# Patient Record
Sex: MALE | Race: OTHER | Employment: FULL TIME | URBAN - METROPOLITAN AREA
[De-identification: names, ages, dates, MRNs, and addresses within clinical notes are randomized per-mention and may not be internally consistent; named-entity substitution may affect disease eponyms.]

---

## 2022-03-18 PROBLEM — Z71.2 ENCOUNTER TO DISCUSS TEST RESULTS: Status: ACTIVE | Noted: 2019-08-09

## 2022-03-18 PROBLEM — R74.01 ELEVATED ALT MEASUREMENT: Status: ACTIVE | Noted: 2019-07-25

## 2022-03-18 PROBLEM — S67.10XA CRUSHING INJURY OF FINGER: Status: ACTIVE | Noted: 2021-12-14

## 2022-03-18 PROBLEM — R21 RASH: Status: ACTIVE | Noted: 2019-07-25

## 2022-03-19 PROBLEM — Z13.228 SCREENING FOR METABOLIC DISORDER: Status: ACTIVE | Noted: 2019-07-25

## 2022-03-19 PROBLEM — B36.0 PITYRIASIS VERSICOLOR: Status: ACTIVE | Noted: 2019-07-25

## 2022-03-19 PROBLEM — S69.92XA INJURY OF NAIL BED OF FINGER OF LEFT HAND: Status: ACTIVE | Noted: 2021-12-14

## 2022-03-19 PROBLEM — Z71.3 DIETARY COUNSELING: Status: ACTIVE | Noted: 2019-07-25

## 2022-03-20 PROBLEM — Z71.82 EXERCISE COUNSELING: Status: ACTIVE | Noted: 2019-07-25

## 2022-03-20 PROBLEM — S62.665A CLOSED NONDISPLACED FRACTURE OF DISTAL PHALANX OF LEFT RING FINGER: Status: ACTIVE | Noted: 2021-12-14

## 2022-03-20 PROBLEM — E78.5 HYPERLIPIDEMIA: Status: ACTIVE | Noted: 2019-08-09

## 2023-02-02 ENCOUNTER — ANESTHESIA (OUTPATIENT)
Dept: SURGERY | Age: 23
End: 2023-02-02
Payer: COMMERCIAL

## 2023-02-02 ENCOUNTER — HOSPITAL ENCOUNTER (OUTPATIENT)
Age: 23
Setting detail: OBSERVATION
LOS: 1 days | Discharge: HOME | End: 2023-02-03
Attending: EMERGENCY MEDICINE | Admitting: SURGERY
Payer: COMMERCIAL

## 2023-02-02 ENCOUNTER — ANESTHESIA EVENT (OUTPATIENT)
Dept: SURGERY | Age: 23
End: 2023-02-02
Payer: COMMERCIAL

## 2023-02-02 ENCOUNTER — APPOINTMENT (OUTPATIENT)
Dept: CT IMAGING | Age: 23
End: 2023-02-02
Payer: COMMERCIAL

## 2023-02-02 DIAGNOSIS — K35.80 ACUTE APPENDICITIS, UNSPECIFIED ACUTE APPENDICITIS TYPE: Primary | ICD-10-CM

## 2023-02-02 LAB
ALBUMIN SERPL-MCNC: 4.3 G/DL (ref 3.5–5)
ALBUMIN/GLOB SERPL: 1.4 (ref 0.4–1.6)
ALP SERPL-CCNC: 94 U/L (ref 50–136)
ALT SERPL-CCNC: 180 U/L (ref 12–65)
ANION GAP SERPL CALC-SCNC: 7 MMOL/L (ref 2–11)
APPEARANCE UR: ABNORMAL
AST SERPL-CCNC: 52 U/L (ref 15–37)
BASOPHILS # BLD: 0.1 K/UL (ref 0–0.2)
BASOPHILS NFR BLD: 0 % (ref 0–2)
BILIRUB SERPL-MCNC: 0.4 MG/DL (ref 0.2–1.1)
BILIRUB UR QL: NEGATIVE
BUN SERPL-MCNC: 15 MG/DL (ref 6–23)
CALCIUM SERPL-MCNC: 9.4 MG/DL (ref 8.3–10.4)
CHLORIDE SERPL-SCNC: 109 MMOL/L (ref 101–110)
CO2 SERPL-SCNC: 26 MMOL/L (ref 21–32)
COLOR UR: ABNORMAL
CREAT SERPL-MCNC: 1 MG/DL (ref 0.8–1.5)
DIFFERENTIAL METHOD BLD: ABNORMAL
EOSINOPHIL # BLD: 0.1 K/UL (ref 0–0.8)
EOSINOPHIL NFR BLD: 1 % (ref 0.5–7.8)
ERYTHROCYTE [DISTWIDTH] IN BLOOD BY AUTOMATED COUNT: 13.7 % (ref 11.9–14.6)
GLOBULIN SER CALC-MCNC: 3.1 G/DL (ref 2.8–4.5)
GLUCOSE SERPL-MCNC: 113 MG/DL (ref 65–100)
GLUCOSE UR STRIP.AUTO-MCNC: NEGATIVE MG/DL
HCT VFR BLD AUTO: 45.9 % (ref 41.1–50.3)
HGB BLD-MCNC: 15.4 G/DL (ref 13.6–17.2)
HGB UR QL STRIP: NEGATIVE
IMM GRANULOCYTES # BLD AUTO: 0.1 K/UL (ref 0–0.5)
IMM GRANULOCYTES NFR BLD AUTO: 1 % (ref 0–5)
KETONES UR QL STRIP.AUTO: NEGATIVE MG/DL
LEUKOCYTE ESTERASE UR QL STRIP.AUTO: NEGATIVE
LIPASE SERPL-CCNC: 79 U/L (ref 73–393)
LYMPHOCYTES # BLD: 2 K/UL (ref 0.5–4.6)
LYMPHOCYTES NFR BLD: 14 % (ref 13–44)
MCH RBC QN AUTO: 27.9 PG (ref 26.1–32.9)
MCHC RBC AUTO-ENTMCNC: 33.6 G/DL (ref 31.4–35)
MCV RBC AUTO: 83.3 FL (ref 82–102)
MONOCYTES # BLD: 0.8 K/UL (ref 0.1–1.3)
MONOCYTES NFR BLD: 6 % (ref 4–12)
NEUTS SEG # BLD: 11.3 K/UL (ref 1.7–8.2)
NEUTS SEG NFR BLD: 78 % (ref 43–78)
NITRITE UR QL STRIP.AUTO: NEGATIVE
NRBC # BLD: 0 K/UL (ref 0–0.2)
PH UR STRIP: 6.5 (ref 5–9)
PLATELET # BLD AUTO: 316 K/UL (ref 150–450)
PMV BLD AUTO: 8.7 FL (ref 9.4–12.3)
POTASSIUM SERPL-SCNC: 4 MMOL/L (ref 3.5–5.1)
PROT SERPL-MCNC: 7.4 G/DL (ref 6.3–8.2)
PROT UR STRIP-MCNC: NEGATIVE MG/DL
RBC # BLD AUTO: 5.51 M/UL (ref 4.23–5.6)
SODIUM SERPL-SCNC: 142 MMOL/L (ref 133–143)
SP GR UR REFRACTOMETRY: 1.03 (ref 1–1.02)
UROBILINOGEN UR QL STRIP.AUTO: 1 EU/DL (ref 0.2–1)
WBC # BLD AUTO: 14.4 K/UL (ref 4.3–11.1)

## 2023-02-02 PROCEDURE — 2720000010 HC SURG SUPPLY STERILE: Performed by: SURGERY

## 2023-02-02 PROCEDURE — 6360000002 HC RX W HCPCS: Performed by: SURGERY

## 2023-02-02 PROCEDURE — 3600000014 HC SURGERY LEVEL 4 ADDTL 15MIN: Performed by: SURGERY

## 2023-02-02 PROCEDURE — 6360000002 HC RX W HCPCS: Performed by: REGISTERED NURSE

## 2023-02-02 PROCEDURE — 96374 THER/PROPH/DIAG INJ IV PUSH: CPT

## 2023-02-02 PROCEDURE — 7100000001 HC PACU RECOVERY - ADDTL 15 MIN: Performed by: SURGERY

## 2023-02-02 PROCEDURE — 2500000003 HC RX 250 WO HCPCS: Performed by: REGISTERED NURSE

## 2023-02-02 PROCEDURE — 96365 THER/PROPH/DIAG IV INF INIT: CPT

## 2023-02-02 PROCEDURE — 83690 ASSAY OF LIPASE: CPT

## 2023-02-02 PROCEDURE — 2580000003 HC RX 258: Performed by: SURGERY

## 2023-02-02 PROCEDURE — 6360000004 HC RX CONTRAST MEDICATION: Performed by: PHYSICIAN ASSISTANT

## 2023-02-02 PROCEDURE — 85025 COMPLETE CBC W/AUTO DIFF WBC: CPT

## 2023-02-02 PROCEDURE — 1100000000 HC RM PRIVATE

## 2023-02-02 PROCEDURE — 80053 COMPREHEN METABOLIC PANEL: CPT

## 2023-02-02 PROCEDURE — 3600000004 HC SURGERY LEVEL 4 BASE: Performed by: SURGERY

## 2023-02-02 PROCEDURE — 6370000000 HC RX 637 (ALT 250 FOR IP): Performed by: SURGERY

## 2023-02-02 PROCEDURE — 3700000000 HC ANESTHESIA ATTENDED CARE: Performed by: SURGERY

## 2023-02-02 PROCEDURE — 2709999900 HC NON-CHARGEABLE SUPPLY: Performed by: SURGERY

## 2023-02-02 PROCEDURE — 88304 TISSUE EXAM BY PATHOLOGIST: CPT

## 2023-02-02 PROCEDURE — 96375 TX/PRO/DX INJ NEW DRUG ADDON: CPT

## 2023-02-02 PROCEDURE — 6360000002 HC RX W HCPCS: Performed by: PHYSICIAN ASSISTANT

## 2023-02-02 PROCEDURE — 2580000003 HC RX 258: Performed by: REGISTERED NURSE

## 2023-02-02 PROCEDURE — 99285 EMERGENCY DEPT VISIT HI MDM: CPT

## 2023-02-02 PROCEDURE — 3700000001 HC ADD 15 MINUTES (ANESTHESIA): Performed by: SURGERY

## 2023-02-02 PROCEDURE — 2580000003 HC RX 258: Performed by: PHYSICIAN ASSISTANT

## 2023-02-02 PROCEDURE — 81003 URINALYSIS AUTO W/O SCOPE: CPT

## 2023-02-02 PROCEDURE — 2500000003 HC RX 250 WO HCPCS: Performed by: SURGERY

## 2023-02-02 PROCEDURE — 74177 CT ABD & PELVIS W/CONTRAST: CPT

## 2023-02-02 PROCEDURE — 7100000000 HC PACU RECOVERY - FIRST 15 MIN: Performed by: SURGERY

## 2023-02-02 RX ORDER — LIDOCAINE HYDROCHLORIDE 20 MG/ML
INJECTION, SOLUTION EPIDURAL; INFILTRATION; INTRACAUDAL; PERINEURAL PRN
Status: DISCONTINUED | OUTPATIENT
Start: 2023-02-02 | End: 2023-02-02 | Stop reason: SDUPTHER

## 2023-02-02 RX ORDER — BUPIVACAINE HYDROCHLORIDE 5 MG/ML
INJECTION, SOLUTION EPIDURAL; INTRACAUDAL PRN
Status: DISCONTINUED | OUTPATIENT
Start: 2023-02-02 | End: 2023-02-02 | Stop reason: HOSPADM

## 2023-02-02 RX ORDER — SUCCINYLCHOLINE/SOD CL,ISO/PF 200MG/10ML
SYRINGE (ML) INTRAVENOUS PRN
Status: DISCONTINUED | OUTPATIENT
Start: 2023-02-02 | End: 2023-02-02 | Stop reason: SDUPTHER

## 2023-02-02 RX ORDER — SODIUM CHLORIDE, SODIUM LACTATE, POTASSIUM CHLORIDE, CALCIUM CHLORIDE 600; 310; 30; 20 MG/100ML; MG/100ML; MG/100ML; MG/100ML
INJECTION, SOLUTION INTRAVENOUS CONTINUOUS PRN
Status: DISCONTINUED | OUTPATIENT
Start: 2023-02-02 | End: 2023-02-02 | Stop reason: SDUPTHER

## 2023-02-02 RX ORDER — ACETAMINOPHEN 325 MG/1
650 TABLET ORAL EVERY 4 HOURS PRN
Status: DISCONTINUED | OUTPATIENT
Start: 2023-02-02 | End: 2023-02-03 | Stop reason: HOSPADM

## 2023-02-02 RX ORDER — ROCURONIUM BROMIDE 10 MG/ML
INJECTION, SOLUTION INTRAVENOUS PRN
Status: DISCONTINUED | OUTPATIENT
Start: 2023-02-02 | End: 2023-02-02 | Stop reason: SDUPTHER

## 2023-02-02 RX ORDER — ONDANSETRON 2 MG/ML
4 INJECTION INTRAMUSCULAR; INTRAVENOUS EVERY 6 HOURS PRN
Status: DISCONTINUED | OUTPATIENT
Start: 2023-02-02 | End: 2023-02-03 | Stop reason: HOSPADM

## 2023-02-02 RX ORDER — NEOSTIGMINE METHYLSULFATE 1 MG/ML
INJECTION, SOLUTION INTRAVENOUS PRN
Status: DISCONTINUED | OUTPATIENT
Start: 2023-02-02 | End: 2023-02-02 | Stop reason: SDUPTHER

## 2023-02-02 RX ORDER — SODIUM CHLORIDE 0.9 % (FLUSH) 0.9 %
5-40 SYRINGE (ML) INJECTION PRN
Status: DISCONTINUED | OUTPATIENT
Start: 2023-02-02 | End: 2023-02-02 | Stop reason: HOSPADM

## 2023-02-02 RX ORDER — OXYCODONE HYDROCHLORIDE 5 MG/1
5 TABLET ORAL PRN
Status: DISCONTINUED | OUTPATIENT
Start: 2023-02-02 | End: 2023-02-02 | Stop reason: HOSPADM

## 2023-02-02 RX ORDER — MORPHINE SULFATE 2 MG/ML
2 INJECTION, SOLUTION INTRAMUSCULAR; INTRAVENOUS EVERY 4 HOURS PRN
Status: DISCONTINUED | OUTPATIENT
Start: 2023-02-02 | End: 2023-02-03 | Stop reason: HOSPADM

## 2023-02-02 RX ORDER — PROPOFOL 10 MG/ML
INJECTION, EMULSION INTRAVENOUS PRN
Status: DISCONTINUED | OUTPATIENT
Start: 2023-02-02 | End: 2023-02-02 | Stop reason: SDUPTHER

## 2023-02-02 RX ORDER — OXYCODONE HYDROCHLORIDE 5 MG/1
10 TABLET ORAL PRN
Status: DISCONTINUED | OUTPATIENT
Start: 2023-02-02 | End: 2023-02-02 | Stop reason: HOSPADM

## 2023-02-02 RX ORDER — FENTANYL CITRATE 50 UG/ML
INJECTION, SOLUTION INTRAMUSCULAR; INTRAVENOUS PRN
Status: DISCONTINUED | OUTPATIENT
Start: 2023-02-02 | End: 2023-02-02 | Stop reason: SDUPTHER

## 2023-02-02 RX ORDER — ZOLPIDEM TARTRATE 5 MG/1
5 TABLET ORAL NIGHTLY PRN
Status: DISCONTINUED | OUTPATIENT
Start: 2023-02-02 | End: 2023-02-03 | Stop reason: HOSPADM

## 2023-02-02 RX ORDER — HYDROMORPHONE HCL 110MG/55ML
0.5 PATIENT CONTROLLED ANALGESIA SYRINGE INTRAVENOUS EVERY 5 MIN PRN
Status: DISCONTINUED | OUTPATIENT
Start: 2023-02-02 | End: 2023-02-02 | Stop reason: HOSPADM

## 2023-02-02 RX ORDER — SODIUM CHLORIDE 0.9 % (FLUSH) 0.9 %
5-40 SYRINGE (ML) INJECTION EVERY 12 HOURS SCHEDULED
Status: DISCONTINUED | OUTPATIENT
Start: 2023-02-02 | End: 2023-02-02 | Stop reason: HOSPADM

## 2023-02-02 RX ORDER — 0.9 % SODIUM CHLORIDE 0.9 %
100 INTRAVENOUS SOLUTION INTRAVENOUS
Status: COMPLETED | OUTPATIENT
Start: 2023-02-02 | End: 2023-02-02

## 2023-02-02 RX ORDER — ONDANSETRON 2 MG/ML
4 INJECTION INTRAMUSCULAR; INTRAVENOUS ONCE
Status: COMPLETED | OUTPATIENT
Start: 2023-02-02 | End: 2023-02-02

## 2023-02-02 RX ORDER — OXYCODONE HYDROCHLORIDE 5 MG/1
5 TABLET ORAL EVERY 4 HOURS PRN
Status: DISCONTINUED | OUTPATIENT
Start: 2023-02-02 | End: 2023-02-03 | Stop reason: HOSPADM

## 2023-02-02 RX ORDER — SODIUM CHLORIDE 0.9 % (FLUSH) 0.9 %
10 SYRINGE (ML) INJECTION
Status: COMPLETED | OUTPATIENT
Start: 2023-02-02 | End: 2023-02-02

## 2023-02-02 RX ORDER — SODIUM CHLORIDE 9 MG/ML
INJECTION, SOLUTION INTRAVENOUS PRN
Status: DISCONTINUED | OUTPATIENT
Start: 2023-02-02 | End: 2023-02-02 | Stop reason: HOSPADM

## 2023-02-02 RX ORDER — HYDROMORPHONE HCL 110MG/55ML
0.25 PATIENT CONTROLLED ANALGESIA SYRINGE INTRAVENOUS EVERY 5 MIN PRN
Status: DISCONTINUED | OUTPATIENT
Start: 2023-02-02 | End: 2023-02-02 | Stop reason: HOSPADM

## 2023-02-02 RX ORDER — OXYCODONE HYDROCHLORIDE 5 MG/1
10 TABLET ORAL EVERY 4 HOURS PRN
Status: DISCONTINUED | OUTPATIENT
Start: 2023-02-02 | End: 2023-02-03 | Stop reason: HOSPADM

## 2023-02-02 RX ORDER — ONDANSETRON 2 MG/ML
4 INJECTION INTRAMUSCULAR; INTRAVENOUS
Status: DISCONTINUED | OUTPATIENT
Start: 2023-02-02 | End: 2023-02-02 | Stop reason: HOSPADM

## 2023-02-02 RX ORDER — 0.9 % SODIUM CHLORIDE 0.9 %
1000 INTRAVENOUS SOLUTION INTRAVENOUS ONCE
Status: COMPLETED | OUTPATIENT
Start: 2023-02-02 | End: 2023-02-02

## 2023-02-02 RX ORDER — GLYCOPYRROLATE 0.2 MG/ML
INJECTION INTRAMUSCULAR; INTRAVENOUS PRN
Status: DISCONTINUED | OUTPATIENT
Start: 2023-02-02 | End: 2023-02-02 | Stop reason: SDUPTHER

## 2023-02-02 RX ORDER — DIPHENHYDRAMINE HYDROCHLORIDE 50 MG/ML
6.25 INJECTION INTRAMUSCULAR; INTRAVENOUS
Status: DISCONTINUED | OUTPATIENT
Start: 2023-02-02 | End: 2023-02-02 | Stop reason: HOSPADM

## 2023-02-02 RX ORDER — MORPHINE SULFATE 4 MG/ML
4 INJECTION, SOLUTION INTRAMUSCULAR; INTRAVENOUS
Status: DISCONTINUED | OUTPATIENT
Start: 2023-02-02 | End: 2023-02-02

## 2023-02-02 RX ORDER — SODIUM CHLORIDE 9 MG/ML
INJECTION, SOLUTION INTRAVENOUS CONTINUOUS
Status: DISCONTINUED | OUTPATIENT
Start: 2023-02-02 | End: 2023-02-03 | Stop reason: HOSPADM

## 2023-02-02 RX ADMIN — ONDANSETRON 4 MG: 2 INJECTION INTRAMUSCULAR; INTRAVENOUS at 16:59

## 2023-02-02 RX ADMIN — ROCURONIUM BROMIDE 5 MG: 50 INJECTION INTRAVENOUS at 20:45

## 2023-02-02 RX ADMIN — SODIUM CHLORIDE, SODIUM LACTATE, POTASSIUM CHLORIDE, AND CALCIUM CHLORIDE: 600; 310; 30; 20 INJECTION, SOLUTION INTRAVENOUS at 20:38

## 2023-02-02 RX ADMIN — ROCURONIUM BROMIDE 20 MG: 50 INJECTION INTRAVENOUS at 20:58

## 2023-02-02 RX ADMIN — ONDANSETRON 4 MG: 2 INJECTION INTRAMUSCULAR; INTRAVENOUS at 23:31

## 2023-02-02 RX ADMIN — SODIUM CHLORIDE: 9 INJECTION, SOLUTION INTRAVENOUS at 23:17

## 2023-02-02 RX ADMIN — IOPAMIDOL 100 ML: 755 INJECTION, SOLUTION INTRAVENOUS at 17:59

## 2023-02-02 RX ADMIN — Medication 3 MG: at 21:26

## 2023-02-02 RX ADMIN — GLYCOPYRROLATE 0.4 MG: 0.2 INJECTION INTRAMUSCULAR; INTRAVENOUS at 21:26

## 2023-02-02 RX ADMIN — OXYCODONE HYDROCHLORIDE 10 MG: 5 TABLET ORAL at 23:31

## 2023-02-02 RX ADMIN — SODIUM CHLORIDE 100 ML: 9 INJECTION, SOLUTION INTRAVENOUS at 17:59

## 2023-02-02 RX ADMIN — SODIUM CHLORIDE, PRESERVATIVE FREE 10 ML: 5 INJECTION INTRAVENOUS at 17:59

## 2023-02-02 RX ADMIN — FENTANYL CITRATE 100 MCG: 50 INJECTION, SOLUTION INTRAMUSCULAR; INTRAVENOUS at 20:42

## 2023-02-02 RX ADMIN — PIPERACILLIN AND TAZOBACTAM 4500 MG: 4; .5 INJECTION, POWDER, LYOPHILIZED, FOR SOLUTION INTRAVENOUS at 18:46

## 2023-02-02 RX ADMIN — Medication 140 MG: at 20:45

## 2023-02-02 RX ADMIN — SODIUM CHLORIDE 1000 ML: 9 INJECTION, SOLUTION INTRAVENOUS at 16:58

## 2023-02-02 RX ADMIN — PROPOFOL 200 MG: 10 INJECTION, EMULSION INTRAVENOUS at 20:45

## 2023-02-02 RX ADMIN — LIDOCAINE HYDROCHLORIDE 100 MG: 20 INJECTION, SOLUTION EPIDURAL; INFILTRATION; INTRACAUDAL; PERINEURAL at 20:45

## 2023-02-02 ASSESSMENT — PAIN DESCRIPTION - PAIN TYPE: TYPE: SURGICAL PAIN

## 2023-02-02 ASSESSMENT — LIFESTYLE VARIABLES
HOW MANY STANDARD DRINKS CONTAINING ALCOHOL DO YOU HAVE ON A TYPICAL DAY: 1 OR 2
HOW OFTEN DO YOU HAVE A DRINK CONTAINING ALCOHOL: 2-4 TIMES A MONTH

## 2023-02-02 ASSESSMENT — ENCOUNTER SYMPTOMS
RESPIRATORY NEGATIVE: 1
ABDOMINAL PAIN: 1
NAUSEA: 1

## 2023-02-02 ASSESSMENT — PAIN DESCRIPTION - LOCATION
LOCATION: ABDOMEN
LOCATION: ABDOMEN

## 2023-02-02 ASSESSMENT — PAIN SCALES - GENERAL
PAINLEVEL_OUTOF10: 3
PAINLEVEL_OUTOF10: 7
PAINLEVEL_OUTOF10: 3

## 2023-02-02 ASSESSMENT — PAIN - FUNCTIONAL ASSESSMENT
PAIN_FUNCTIONAL_ASSESSMENT: 0-10
PAIN_FUNCTIONAL_ASSESSMENT: NONE - DENIES PAIN
PAIN_FUNCTIONAL_ASSESSMENT: 0-10

## 2023-02-02 ASSESSMENT — PAIN DESCRIPTION - ORIENTATION
ORIENTATION: ANTERIOR;MID
ORIENTATION: RIGHT;LOWER

## 2023-02-02 ASSESSMENT — PAIN DESCRIPTION - DESCRIPTORS
DESCRIPTORS: SHARP;STABBING
DESCRIPTORS: ACHING;SHARP

## 2023-02-02 NOTE — ED TRIAGE NOTES
Pt to ED from home. Pt c/o of sharp pain in right lower abdomen starting this morning. Worsens with ambulation or standing up. Pt took ibuprofen at home and states it has helped some. Pt denies any issues with urination. + nausea. Denies chest pain, headaches, dizziness, or VD.

## 2023-02-02 NOTE — ED PROVIDER NOTES
Emergency Department Provider Note                   PCP:                Elizabeth Vegas MD               Age: 25 y.o. Sex: male       ICD-10-CM    1. Acute appendicitis, unspecified acute appendicitis type  K35.80           DISPOSITION Decision To Admit 02/02/2023 06:36:52 PM        Medical Decision Making  Patient is 61-year-old male with no significant past medical history who presents with 1 day of right lower quadrant abdominal pain associated with nausea. White count 14,000. CT shows uncomplicated acute appendicitis. Surgery team consulted for admission via [email or web address]. Jeanette De Luna has contacted Dr. Camille Turner. [facility] the patient n.p.o. The patient has a lot of questions about surgery. Will defer to surgery team to answer these questions. Amount and/or Complexity of Data Reviewed  Labs: ordered. Radiology: ordered. Risk  Prescription drug management. Decision regarding hospitalization.                                 Orders Placed This Encounter   Procedures    CT ABDOMEN PELVIS W IV CONTRAST Additional Contrast? None    CBC with Auto Differential    CMP    Lipase    Urinalysis w rflx microscopic        Medications   0.9 % sodium chloride bolus (1,000 mLs IntraVENous New Bag 2/2/23 1658)   morphine sulfate (PF) injection 4 mg (0 mg IntraVENous Held 2/2/23 1720)   piperacillin-tazobactam (ZOSYN) 4,500 mg in sodium chloride 0.9 % 100 mL IVPB (mini-bag) (has no administration in time range)   ondansetron (ZOFRAN) injection 4 mg (4 mg IntraVENous Given 2/2/23 1659)   iopamidol (ISOVUE-370) 76 % injection 100 mL (100 mLs IntraVENous Given 2/2/23 1759)   0.9 % sodium chloride bolus (100 mLs IntraVENous New Bag 2/2/23 1759)   sodium chloride flush 0.9 % injection 10 mL (10 mLs IntraVENous Given 2/2/23 1759)       New Prescriptions    No medications on file        Paul Goff is a 25 y.o. male who presents to the Emergency Department with chief complaint of    Chief Complaint   Patient presents with    Abdominal Pain      Patient is a 72-year-old male with no significant past medical history who presents with right lower abdominal pain that began this morning. Initially was coming and going but now it is constant associated with nausea. Worse with movement. No urinary complaints. No diarrhea or vomiting. No fever. No prior abdominal surgeries. No history of the same. He took ibuprofen with some improvement of the pain. Review of Systems   Constitutional: Negative. HENT: Negative. Respiratory: Negative. Cardiovascular: Negative. Gastrointestinal:  Positive for abdominal pain and nausea. Genitourinary: Negative. All other systems reviewed and are negative. No past medical history on file. No past surgical history on file. Family History   Problem Relation Age of Onset    No Known Problems Mother     No Known Problems Father         Social History     Socioeconomic History    Marital status: Single   Tobacco Use    Smoking status: Never    Smokeless tobacco: Never   Substance and Sexual Activity    Alcohol use: Never    Drug use: Never         Patient has no known allergies. Previous Medications    KETOCONAZOLE (NIZORAL) 2 % SHAMPOO    Apply shampoo foam to affected area and leave it on for 5-10 min and rinse ,twice a week for 2-4 weeks        Vitals signs and nursing note reviewed. Patient Vitals for the past 4 hrs:   Temp Pulse Resp BP SpO2   02/02/23 1612 -- -- 18 -- --   02/02/23 1607 98.2 °F (36.8 °C) 99 -- (!) 143/84 99 %          Physical Exam  Vitals and nursing note reviewed. Constitutional:       General: He is not in acute distress. Appearance: Normal appearance. He is normal weight. He is not ill-appearing or toxic-appearing. HENT:      Head: Normocephalic. Eyes:      Extraocular Movements: Extraocular movements intact.    Cardiovascular:      Rate and Rhythm: Normal rate and regular rhythm. Pulses: Normal pulses. Heart sounds: Normal heart sounds. Pulmonary:      Effort: Pulmonary effort is normal.      Breath sounds: Normal breath sounds. Abdominal:      Palpations: Abdomen is soft. Tenderness: There is abdominal tenderness in the right lower quadrant. There is no guarding or rebound. Musculoskeletal:         General: No swelling or tenderness. Normal range of motion. Cervical back: Normal range of motion and neck supple. Skin:     General: Skin is warm and dry. Findings: No rash. Neurological:      General: No focal deficit present. Mental Status: He is alert and oriented to person, place, and time. Mental status is at baseline. Psychiatric:         Mood and Affect: Mood normal.         Behavior: Behavior normal.         Thought Content: Thought content normal.        Procedures    Results for orders placed or performed during the hospital encounter of 02/02/23   CT ABDOMEN PELVIS W IV CONTRAST Additional Contrast? None    Narrative    STUDY: CT ABDOMEN PELVIS W IV CONTRAST [id number]     STUDY DATE: 2/2/2023 6:04 PM     HISTORY: RLQ abd pain     COMPARISON:  None available. TECHNIQUE: Contiguous axial CT images were obtained of the abdomen and pelvis  with the administration of intravenous contrast. Coronal and sagittal  reconstructions were performed. Radiation dose reduction techniques were used  for this study: All CT scans performed at this facility use one or all of the  following: Automated exposure control, adjustment of the mA and/or kVp according  to patient's size, iterative reconstruction. CONTRAST 100 mL of Isovue-370    FINDINGS:    LOWER THORAX: Lung bases are clear. No pericardial or pleural effusion. LIVER: Marked hepatic steatosis. BILIARY: The gallbladder is normal. No intrahepatic or extrahepatic biliary duct  dilation. PANCREAS: Unremarkable. SPLEEN: No splenomegaly.     ADRENALS: No adrenal nodule or adrenal hypertrophy. URINARY: No nephrolithiasis. No hydronephrosis. The bladder is unremarkable. BOWEL: Stomach, small bowel, and large bowel are normal in course and caliber. No bowel wall thickening. No pneumoperitoneum. The appendix is enlarged with  mild adjacent fat stranding. VASCULAR: The abdominal aorta and iliac arterial system are normal in caliber. NODES: No lymphadenopathy. FLUID:  No intraperitoneal free fluid. REPRODUCTIVE: Unremarkable. BONES/SOFT TISSUE: No fracture or aggressive osseous abnormality. No acute  subcutaneous soft tissue abnormality. Bilateral gynecomastia. Impression    1. Findings compatible with acute, uncomplicated appendicitis. 2.  Hepatic cirrhosis.        CBC with Auto Differential   Result Value Ref Range    WBC 14.4 (H) 4.3 - 11.1 K/uL    RBC 5.51 4.23 - 5.6 M/uL    Hemoglobin 15.4 13.6 - 17.2 g/dL    Hematocrit 45.9 41.1 - 50.3 %    MCV 83.3 82 - 102 FL    MCH 27.9 26.1 - 32.9 PG    MCHC 33.6 31.4 - 35.0 g/dL    RDW 13.7 11.9 - 14.6 %    Platelets 252 415 - 831 K/uL    MPV 8.7 (L) 9.4 - 12.3 FL    nRBC 0.00 0.0 - 0.2 K/uL    Differential Type AUTOMATED      Seg Neutrophils 78 43 - 78 %    Lymphocytes 14 13 - 44 %    Monocytes 6 4.0 - 12.0 %    Eosinophils % 1 0.5 - 7.8 %    Basophils 0 0.0 - 2.0 %    Immature Granulocytes 1 0.0 - 5.0 %    Segs Absolute 11.3 (H) 1.7 - 8.2 K/UL    Absolute Lymph # 2.0 0.5 - 4.6 K/UL    Absolute Mono # 0.8 0.1 - 1.3 K/UL    Absolute Eos # 0.1 0.0 - 0.8 K/UL    Basophils Absolute 0.1 0.0 - 0.2 K/UL    Absolute Immature Granulocyte 0.1 0.0 - 0.5 K/UL   CMP   Result Value Ref Range    Sodium 142 133 - 143 mmol/L    Potassium 4.0 3.5 - 5.1 mmol/L    Chloride 109 101 - 110 mmol/L    CO2 26 21 - 32 mmol/L    Anion Gap 7 2 - 11 mmol/L    Glucose 113 (H) 65 - 100 mg/dL    BUN 15 6 - 23 MG/DL    Creatinine 1.00 0.8 - 1.5 MG/DL    Est, Glom Filt Rate >60 >60 ml/min/1.73m2    Calcium 9.4 8.3 - 10.4 MG/DL    Total Bilirubin 0.4 0.2 - 1.1 MG/DL     (H) 12 - 65 U/L    AST 52 (H) 15 - 37 U/L    Alk Phosphatase 94 50 - 136 U/L    Total Protein 7.4 6.3 - 8.2 g/dL    Albumin 4.3 3.5 - 5.0 g/dL    Globulin 3.1 2.8 - 4.5 g/dL    Albumin/Globulin Ratio 1.4 0.4 - 1.6     Lipase   Result Value Ref Range    Lipase 79 73 - 393 U/L   Urinalysis w rflx microscopic   Result Value Ref Range    Color, UA YELLOW/STRAW      Appearance CLOUDY      Specific Gravity, UA 1.029 (H) 1.001 - 1.023      pH, Urine 6.5 5.0 - 9.0      Protein, UA Negative NEG mg/dL    Glucose, UA Negative mg/dL    Ketones, Urine Negative NEG mg/dL    Bilirubin Urine Negative NEG      Blood, Urine Negative NEG      Urobilinogen, Urine 1.0 0.2 - 1.0 EU/dL    Nitrite, Urine Negative NEG      Leukocyte Esterase, Urine Negative NEG          CT ABDOMEN PELVIS W IV CONTRAST Additional Contrast? None   Final Result      1. Findings compatible with acute, uncomplicated appendicitis. 2.  Hepatic cirrhosis. Voice dictation software was used during the making of this note. This software is not perfect and grammatical and other typographical errors may be present. This note has not been completely proofread for errors.        Jalil Veema  02/02/23 [id number]

## 2023-02-03 VITALS
SYSTOLIC BLOOD PRESSURE: 123 MMHG | DIASTOLIC BLOOD PRESSURE: 69 MMHG | RESPIRATION RATE: 18 BRPM | WEIGHT: 198.2 LBS | TEMPERATURE: 98.2 F | HEIGHT: 68 IN | OXYGEN SATURATION: 95 % | BODY MASS INDEX: 30.04 KG/M2 | HEART RATE: 102 BPM

## 2023-02-03 LAB
ANION GAP SERPL CALC-SCNC: 8 MMOL/L (ref 2–11)
BASOPHILS # BLD: 0 K/UL (ref 0–0.2)
BASOPHILS NFR BLD: 0 % (ref 0–2)
BUN SERPL-MCNC: 11 MG/DL (ref 6–23)
CALCIUM SERPL-MCNC: 8.5 MG/DL (ref 8.3–10.4)
CHLORIDE SERPL-SCNC: 108 MMOL/L (ref 101–110)
CO2 SERPL-SCNC: 23 MMOL/L (ref 21–32)
CREAT SERPL-MCNC: 1.1 MG/DL (ref 0.8–1.5)
DIFFERENTIAL METHOD BLD: ABNORMAL
EOSINOPHIL # BLD: 0 K/UL (ref 0–0.8)
EOSINOPHIL NFR BLD: 0 % (ref 0.5–7.8)
ERYTHROCYTE [DISTWIDTH] IN BLOOD BY AUTOMATED COUNT: 13.8 % (ref 11.9–14.6)
GLUCOSE SERPL-MCNC: 98 MG/DL (ref 65–100)
HCT VFR BLD AUTO: 42.6 % (ref 41.1–50.3)
HGB BLD-MCNC: 14.4 G/DL (ref 13.6–17.2)
IMM GRANULOCYTES # BLD AUTO: 0.1 K/UL (ref 0–0.5)
IMM GRANULOCYTES NFR BLD AUTO: 1 % (ref 0–5)
LYMPHOCYTES # BLD: 2.9 K/UL (ref 0.5–4.6)
LYMPHOCYTES NFR BLD: 15 % (ref 13–44)
MCH RBC QN AUTO: 28.3 PG (ref 26.1–32.9)
MCHC RBC AUTO-ENTMCNC: 33.8 G/DL (ref 31.4–35)
MCV RBC AUTO: 83.9 FL (ref 82–102)
MONOCYTES # BLD: 1.1 K/UL (ref 0.1–1.3)
MONOCYTES NFR BLD: 6 % (ref 4–12)
NEUTS SEG # BLD: 15 K/UL (ref 1.7–8.2)
NEUTS SEG NFR BLD: 78 % (ref 43–78)
NRBC # BLD: 0 K/UL (ref 0–0.2)
PLATELET # BLD AUTO: 285 K/UL (ref 150–450)
PMV BLD AUTO: 8.9 FL (ref 9.4–12.3)
POTASSIUM SERPL-SCNC: 3.6 MMOL/L (ref 3.5–5.1)
RBC # BLD AUTO: 5.08 M/UL (ref 4.23–5.6)
SODIUM SERPL-SCNC: 139 MMOL/L (ref 133–143)
WBC # BLD AUTO: 19.1 K/UL (ref 4.3–11.1)

## 2023-02-03 PROCEDURE — 6360000002 HC RX W HCPCS: Performed by: SURGERY

## 2023-02-03 PROCEDURE — 36415 COLL VENOUS BLD VENIPUNCTURE: CPT

## 2023-02-03 PROCEDURE — 6370000000 HC RX 637 (ALT 250 FOR IP): Performed by: SURGERY

## 2023-02-03 PROCEDURE — 80048 BASIC METABOLIC PNL TOTAL CA: CPT

## 2023-02-03 PROCEDURE — 85025 COMPLETE CBC W/AUTO DIFF WBC: CPT

## 2023-02-03 PROCEDURE — G0378 HOSPITAL OBSERVATION PER HR: HCPCS

## 2023-02-03 PROCEDURE — 2580000003 HC RX 258: Performed by: SURGERY

## 2023-02-03 RX ORDER — OXYCODONE HYDROCHLORIDE AND ACETAMINOPHEN 5; 325 MG/1; MG/1
1 TABLET ORAL EVERY 6 HOURS PRN
Qty: 12 TABLET | Refills: 0 | Status: SHIPPED | OUTPATIENT
Start: 2023-02-03 | End: 2023-02-08

## 2023-02-03 RX ORDER — AMOXICILLIN AND CLAVULANATE POTASSIUM 875; 125 MG/1; MG/1
1 TABLET, FILM COATED ORAL 2 TIMES DAILY
Qty: 14 TABLET | Refills: 0 | Status: SHIPPED | OUTPATIENT
Start: 2023-02-03 | End: 2023-02-10

## 2023-02-03 RX ADMIN — MORPHINE SULFATE 2 MG: 2 INJECTION, SOLUTION INTRAMUSCULAR; INTRAVENOUS at 00:51

## 2023-02-03 RX ADMIN — ACETAMINOPHEN 650 MG: 325 TABLET ORAL at 08:47

## 2023-02-03 RX ADMIN — PIPERACILLIN AND TAZOBACTAM 3375 MG: 3; .375 INJECTION, POWDER, LYOPHILIZED, FOR SOLUTION INTRAVENOUS at 08:42

## 2023-02-03 RX ADMIN — PIPERACILLIN AND TAZOBACTAM 3375 MG: 3; .375 INJECTION, POWDER, LYOPHILIZED, FOR SOLUTION INTRAVENOUS at 01:18

## 2023-02-03 ASSESSMENT — PAIN DESCRIPTION - LOCATION
LOCATION: ABDOMEN

## 2023-02-03 ASSESSMENT — ENCOUNTER SYMPTOMS
VOMITING: 0
ABDOMINAL DISTENTION: 0
DIARRHEA: 0
NAUSEA: 0
CONSTIPATION: 0
GASTROINTESTINAL NEGATIVE: 1

## 2023-02-03 ASSESSMENT — PAIN DESCRIPTION - ONSET: ONSET: GRADUAL

## 2023-02-03 ASSESSMENT — PAIN SCALES - GENERAL
PAINLEVEL_OUTOF10: 7
PAINLEVEL_OUTOF10: 1
PAINLEVEL_OUTOF10: 7
PAINLEVEL_OUTOF10: 3
PAINLEVEL_OUTOF10: 7
PAINLEVEL_OUTOF10: 0

## 2023-02-03 ASSESSMENT — PAIN DESCRIPTION - DESCRIPTORS
DESCRIPTORS: ACHING;SHARP
DESCRIPTORS: SHARP
DESCRIPTORS: ACHING
DESCRIPTORS: SHARP
DESCRIPTORS: ACHING;SHARP

## 2023-02-03 ASSESSMENT — PAIN DESCRIPTION - ORIENTATION
ORIENTATION: MID;ANTERIOR
ORIENTATION: ANTERIOR
ORIENTATION: ANTERIOR
ORIENTATION: MID;LOWER
ORIENTATION: ANTERIOR;MID

## 2023-02-03 ASSESSMENT — PAIN DESCRIPTION - FREQUENCY: FREQUENCY: INTERMITTENT

## 2023-02-03 ASSESSMENT — PAIN DESCRIPTION - PAIN TYPE
TYPE: SURGICAL PAIN

## 2023-02-03 ASSESSMENT — PAIN - FUNCTIONAL ASSESSMENT: PAIN_FUNCTIONAL_ASSESSMENT: ACTIVITIES ARE NOT PREVENTED

## 2023-02-03 NOTE — PROGRESS NOTES
[address] [facility]  [phone]        Name: Javier Varghese      MRN: [id number]       : 2000             PCP: Zach Michaud MD       CC:  No chief complaint on file. HPI:  Eligio Varghese is a 25y.o. year-old male who presents for a post-op visit s/p LAPAROSCOPIC APPENDECTOMY done per Dr. Merlyn Samuels on 2023. Findings: appendicitis    Patient reports     Patient denies    Path Report:   Date Obtained:   2023   DIAGNOSIS        \"APPENDIX\":  CARCINOID TUMOR, 0.5 CM IN GREATEST DIMENSION, MARGINS UNINVOLVED;   ACUTE APPENDICITIS. The appendix shows a 0.5 cm focus of carcinoid tumor in the mid   portion. Transmural acute inflammation is also seen. The margin is free   of tumor. Dissection reveals grossly unremarkable appendiceal mucosa and no fecaliths. Objective:   Review of Systems   Constitutional:  Negative for chills and fever. Gastrointestinal: Negative. Negative for abdominal distention, constipation, diarrhea, nausea and vomiting. Physical Exam  Constitutional:       Appearance: Normal appearance. Abdominal:      General: Bowel sounds are normal.      Palpations: Abdomen is soft. Comments: Trochar sites approximated with no erythema or drainage. Skin:     General: Skin is warm. Capillary Refill: Capillary refill takes less than 2 seconds. Neurological:      General: No focal deficit present. Mental Status: He is alert and oriented to person, place, and time. Psychiatric:         Mood and Affect: Mood normal.         Behavior: Behavior normal.       Assessment/Plan:   25y.o. year-old male who presents for a post-op visit s/p LAPAROSCOPIC APPENDECTOMY done per Dr. Merlyn Samuels on 2023.   Copy of path report given and discussed with patient.     -Follow up PRN  -No heavy lifting > 20 pounds x 4 weeks post op  -No tub bath until trochar sites are completely healed  -Diet as tolerated    Bishop Cox, APRN - NP

## 2023-02-03 NOTE — PROGRESS NOTES
GENERAL SURGERY HISTORY AND PHYSICAL    Name:  Mikaela Fajardo  Date/Time of Admission: 2023  4:22 PM  CSN: [id number]  Attending Provider: Lenin Beal MD  Room/Bed:  [place] [address]  : 2000  25 y.o. SUBJECTIVE:    CHIEF COMPLAINT: abdominal pain    HPI: 70-year-old male with no significant past medical history who presents with 1 day of right lower quadrant abdominal pain associated with nausea. White count 14,000. CT shows uncomplicated acute appendicitis. No past medical history on file. No past surgical history on file. Patient has no known allergies. Prior to Admission medications    Medication Sig Start Date End Date Taking? Authorizing Provider   ketoconazole (NIZORAL) 2 % shampoo Apply shampoo foam to affected area and leave it on for 5-10 min and rinse ,twice a week for 2-4 weeks 19   Ar Automatic Reconciliation       Scheduled Meds:   morphine  4 mg IntraVENous NOW     Continuous Infusions:  PRN Meds:.     Social History     Socioeconomic History    Marital status: Single     Spouse name: Not on file    Number of children: Not on file    Years of education: Not on file    Highest education level: Not on file   Occupational History    Not on file   Tobacco Use    Smoking status: Never    Smokeless tobacco: Never   Substance and Sexual Activity    Alcohol use: Never    Drug use: Never    Sexual activity: Not on file   Other Topics Concern    Not on file   Social History Narrative    Not on file     Social Determinants of Health     Financial Resource Strain: Not on file   Food Insecurity: Not on file   Transportation Needs: Not on file   Physical Activity: Not on file   Stress: Not on file   Social Connections: Not on file   Intimate Partner Violence: Not on file   Housing Stability: Not on file       Family History   Problem Relation Age of Onset    No Known Problems Mother     No Known Problems Father        REVIEW of SYSTEMS: 10 point review of systems reviewed and negative unless noted above in the HPI      OBJECTIVE:    VITAL SIGNS:  BP: [unfilled]  HR: [unfilled]  RR: [unfilled]  SpO2: [unfilled]  Temp: [unfilled]  I/O: No intake/output data recorded. PHYSICAL EXAM:  General Appearance AOx3, in no acute distress  Eyes Conjunctivae/corneas clear. PERRL, EOM's intact. No scleral icterus  Neck Supple  Respiratory No chest wall deformities or tenderness, respiratory effort normal, no use of accessory muscles. Cardiovascular RRR. Gastrointestinal Abdomen soft, TTP in RLQ  Skin Normal coloration and turgor  Neurological alert, oriented, normal speech  Psychiatric Alert and oriented, appropriate affect      Labs:    CBC BMP LIVER  Recent Labs     02/02/23  1623   WBC 14.4*   HCT 45.9      MCV 83.3      Recent Labs     02/02/23  1623      K 4.0      CO2 26   BUN 15   CREATININE 1.00      Recent Labs     02/02/23  1623   AST 52*   *   ALKPHOS 94   ALBUMIN 1.4         Cardiac ABG Other  No results for input(s): TROPONINI, BNP in the last 72 hours. Invalid input(s): CREATINEPHOSKINASE No results for input(s): PH, PO2, PCO2, [id number] in the last 72 hours. No results for input(s): TSH, [id number], FOLATE in the last 72 hours. Invalid input(s): [id number]    IMAGING STUDIES:  [unfilled]    ASSESSMENT  Active Problems:    * No active hospital problems. *  Resolved Problems:    * No resolved hospital problems. *      Acute appendicitis. Plan on lap appy, possible open. Reviewed surgery and risks including bleeding, infection, damage to nerves/vessels/organs, scarring, recurrence, need for additional procedures. PLAN    Laparoscopic appendectomy, possible open.      Signed: Jose Luis Crawford MD, 2/2/2023, 8:15 PM

## 2023-02-03 NOTE — PROGRESS NOTES
Pt discharged from unit with belongings. Accompanied by family and hospital personnel. Pt transported downstairs via wheelchair. No distress noted at discharge.

## 2023-02-03 NOTE — PLAN OF CARE
Problem: Pain  Goal: Verbalizes/displays adequate comfort level or baseline comfort level  2/3/2023 1144 by Vy Caceres RN  Outcome: Adequate for Discharge  2/3/2023 0929 by Vy Caceres RN  Outcome: Progressing  2/3/2023 0127 by Marielena Patel RN  Outcome: Not Progressing  2/3/2023 0127 by Marielena Patel RN  Outcome: Not Progressing     Problem: Discharge Planning  Goal: Discharge to home or other facility with appropriate resources  2/3/2023 1144 by Vy Caceres RN  Outcome: Adequate for Discharge  2/3/2023 0929 by Vy Caceres RN  Outcome: Progressing     Problem: ABCDS Injury Assessment  Goal: Absence of physical injury  2/3/2023 1144 by Vy Caceres RN  Outcome: Adequate for Discharge  2/3/2023 0929 by Vy Caceres RN  Outcome: Progressing     Problem: Skin/Tissue Integrity  Goal: Absence of new skin breakdown  Description: 1. Monitor for areas of redness and/or skin breakdown  2. Assess vascular access sites hourly  3. Every 4-6 hours minimum:  Change oxygen saturation probe site  4. Every 4-6 hours:  If on nasal continuous positive airway pressure, respiratory therapy assess nares and determine need for appliance change or resting period.   2/3/2023 1144 by Vy Caceres RN  Outcome: Adequate for Discharge  2/3/2023 1631 by Vy Caceres RN  Outcome: Progressing  2/3/2023 0133 by Marielena Patel RN  Outcome: Progressing     Problem: Pain  Goal: Verbalizes/displays adequate comfort level or baseline comfort level  2/3/2023 1144 by Vy Caceres RN  Outcome: Adequate for Discharge  2/3/2023 0929 by Vy Caceres RN  Outcome: Progressing  2/3/2023 0127 by Marielena Patel RN  Outcome: Not Progressing  2/3/2023 0127 by Marielena Patel RN  Outcome: Not Progressing

## 2023-02-03 NOTE — PROGRESS NOTES
Admit Date: 2023    POD 1 Day Post-Op    Procedure:  Procedure(s):  APPENDECTOMY LAPAROSCOPIC    Subjective:     Pt alert with NAD noted. Respirations even and unlabored on room air. Tolerating clear liquid diet with no N or V. Passing flatus. Abdominal pain well controlled with prn po pain medication. Pt agrees with plan to discharge today 2/3/23    Objective:       Vitals:    23 2244 23 0225 23 0737 23 1059   BP: (!) 140/84 121/66 116/71 123/69   Pulse: 99 (!) 122 (!) 115 (!) 102   Resp: 16 17 18 18   Temp: 98.9 °F (37.2 °C) 99.5 °F (37.5 °C) 98.8 °F (37.1 °C) 98.2 °F (36.8 °C)   TempSrc: Oral Oral Oral Oral   SpO2: 94% 94% 96% 95%   Weight:       Height:           Temp (24hrs), Av.7 °F (37.1 °C), Min:98.2 °F (36.8 °C), Max:99.5 °F (37.5 °C)  . I&O reviewed as documented. Voiding  +flatus  Physical Exam  Constitutional:       General: He is not in acute distress. Appearance: Normal appearance. HENT:      Mouth/Throat:      Mouth: Mucous membranes are moist.   Cardiovascular:      Rate and Rhythm: Tachycardia present. Pulses: Normal pulses. Heart sounds: Normal heart sounds. No murmur heard. No friction rub. No gallop. Comments: Hr 102-110s. anxious about being hospitalized. Pulmonary:      Effort: Pulmonary effort is normal. No respiratory distress. Breath sounds: Normal breath sounds. Abdominal:      General: Bowel sounds are normal. There is no distension. Palpations: Abdomen is soft. Genitourinary:     Comments: Voiding   Musculoskeletal:         General: No swelling. Normal range of motion. Cervical back: No rigidity. Skin:     General: Skin is warm and dry. Capillary Refill: Capillary refill takes less than 2 seconds. Comments: Trocar sites with skin adhesive with no signs of infection. Neurological:      Mental Status: He is alert and oriented to person, place, and time.    Psychiatric:         Behavior: Behavior normal.        Labs:   Recent Results (from the past 24 hour(s))   CBC with Auto Differential    Collection Time: 02/02/23  4:23 PM   Result Value Ref Range    WBC 14.4 (H) 4.3 - 11.1 K/uL    RBC 5.51 4.23 - 5.6 M/uL    Hemoglobin 15.4 13.6 - 17.2 g/dL    Hematocrit 45.9 41.1 - 50.3 %    MCV 83.3 82 - 102 FL    MCH 27.9 26.1 - 32.9 PG    MCHC 33.6 31.4 - 35.0 g/dL    RDW 13.7 11.9 - 14.6 %    Platelets 377 259 - 948 K/uL    MPV 8.7 (L) 9.4 - 12.3 FL    nRBC 0.00 0.0 - 0.2 K/uL    Differential Type AUTOMATED      Seg Neutrophils 78 43 - 78 %    Lymphocytes 14 13 - 44 %    Monocytes 6 4.0 - 12.0 %    Eosinophils % 1 0.5 - 7.8 %    Basophils 0 0.0 - 2.0 %    Immature Granulocytes 1 0.0 - 5.0 %    Segs Absolute 11.3 (H) 1.7 - 8.2 K/UL    Absolute Lymph # 2.0 0.5 - 4.6 K/UL    Absolute Mono # 0.8 0.1 - 1.3 K/UL    Absolute Eos # 0.1 0.0 - 0.8 K/UL    Basophils Absolute 0.1 0.0 - 0.2 K/UL    Absolute Immature Granulocyte 0.1 0.0 - 0.5 K/UL   CMP    Collection Time: 02/02/23  4:23 PM   Result Value Ref Range    Sodium 142 133 - 143 mmol/L    Potassium 4.0 3.5 - 5.1 mmol/L    Chloride 109 101 - 110 mmol/L    CO2 26 21 - 32 mmol/L    Anion Gap 7 2 - 11 mmol/L    Glucose 113 (H) 65 - 100 mg/dL    BUN 15 6 - 23 MG/DL    Creatinine 1.00 0.8 - 1.5 MG/DL    Est, Glom Filt Rate >60 >60 ml/min/1.73m2    Calcium 9.4 8.3 - 10.4 MG/DL    Total Bilirubin 0.4 0.2 - 1.1 MG/DL     (H) 12 - 65 U/L    AST 52 (H) 15 - 37 U/L    Alk Phosphatase 94 50 - 136 U/L    Total Protein 7.4 6.3 - 8.2 g/dL    Albumin 4.3 3.5 - 5.0 g/dL    Globulin 3.1 2.8 - 4.5 g/dL    Albumin/Globulin Ratio 1.4 0.4 - 1.6     Lipase    Collection Time: 02/02/23  4:23 PM   Result Value Ref Range    Lipase 79 73 - 393 U/L   Urinalysis w rflx microscopic    Collection Time: 02/02/23  4:23 PM   Result Value Ref Range    Color, UA YELLOW/STRAW      Appearance CLOUDY      Specific Gravity, UA 1.029 (H) 1.001 - 1.023      pH, Urine 6.5 5.0 - 9.0      Protein, UA Negative NEG mg/dL    Glucose, UA Negative mg/dL    Ketones, Urine Negative NEG mg/dL    Bilirubin Urine Negative NEG      Blood, Urine Negative NEG      Urobilinogen, Urine 1.0 0.2 - 1.0 EU/dL    Nitrite, Urine Negative NEG      Leukocyte Esterase, Urine Negative NEG     CBC with Auto Differential    Collection Time: 02/03/23  6:45 AM   Result Value Ref Range    WBC 19.1 (H) 4.3 - 11.1 K/uL    RBC 5.08 4.23 - 5.6 M/uL    Hemoglobin 14.4 13.6 - 17.2 g/dL    Hematocrit 42.6 41.1 - 50.3 %    MCV 83.9 82 - 102 FL    MCH 28.3 26.1 - 32.9 PG    MCHC 33.8 31.4 - 35.0 g/dL    RDW 13.8 11.9 - 14.6 %    Platelets 787 856 - 327 K/uL    MPV 8.9 (L) 9.4 - 12.3 FL    nRBC 0.00 0.0 - 0.2 K/uL    Differential Type AUTOMATED      Seg Neutrophils 78 43 - 78 %    Lymphocytes 15 13 - 44 %    Monocytes 6 4.0 - 12.0 %    Eosinophils % 0 (L) 0.5 - 7.8 %    Basophils 0 0.0 - 2.0 %    Immature Granulocytes 1 0.0 - 5.0 %    Segs Absolute 15.0 (H) 1.7 - 8.2 K/UL    Absolute Lymph # 2.9 0.5 - 4.6 K/UL    Absolute Mono # 1.1 0.1 - 1.3 K/UL    Absolute Eos # 0.0 0.0 - 0.8 K/UL    Basophils Absolute 0.0 0.0 - 0.2 K/UL    Absolute Immature Granulocyte 0.1 0.0 - 0.5 K/UL   Basic Metabolic Panel    Collection Time: 02/03/23  6:45 AM   Result Value Ref Range    Sodium 139 133 - 143 mmol/L    Potassium 3.6 3.5 - 5.1 mmol/L    Chloride 108 101 - 110 mmol/L    CO2 23 21 - 32 mmol/L    Anion Gap 8 2 - 11 mmol/L    Glucose 98 65 - 100 mg/dL    BUN 11 6 - 23 MG/DL    Creatinine 1.10 0.8 - 1.5 MG/DL    Est, Glom Filt Rate >60 >60 ml/min/1.73m2    Calcium 8.5 8.3 - 10.4 MG/DL         Assessment:     Active Problems:    Acute appendicitis  Resolved Problems:    * No resolved hospital problems. *        Plan/Recommendations/Medical Decision Making:     Discharge pt today 2/3/23  MD Instructions: Follow up with your primary care doctor concerning your elevated liver function tests.    Take the entire course of augmentin antibiotics   Follow-up with Nyasia Proctor NP in 2 weeks     Incisions  Keep incisions clean and dry, may remain uncovered. Do not apply lotions, creams or ointments to incisions. Showers are allowed - gently wash and pat dry your incisions. No tub baths or soaking/submerging until cleared by follow up provider. Diet -   Diet as tolerated - Soft foods diet for 2 days after surgery. If you have tolerated soft foods/easy to chew foods for the 1st two days after surgery, you may advance your diet to your regular diet as tolerated. Activity   No heavy lifting >10 lb for the first week after surgery. Lift nothing heavier than 25 lbs for 3 weeks after surgery. Walking and non-strenuous exercise promotes good oxygenated blood supply to body tissues and will assist in recovery. Walking and non-strenuous exercise also promotes good lung mechanics and reduces chance of developing pneumonia. Medications  No driving while taking narcotics/(prescription strength pain medication). Do not drink alcohol while taking narcotics. Resume other home medications. Narcotic pain medication is known to cause constipation as narcotic pain medication slows gut motility. Even if you are not taking oral narcotic pain medication, you have likely been given narcotic pain medication intravenously during your surgical procedure. Do not get constipated! No more than 2 days without a bm. If 2 days no bm, then take colace stool softener twice a day. If 24 hours of colace does not produce a bm , then keep taking colace and add miralax laxative twice a day until you have a bm. Once you are having regular bms, you can use colace and miralax as needed.        If problems (fever, signs of infection, uncontrolled bleeding or drainage from surgical incision, uncontrolled pain, uncontrolled nausea and/or vomiting) or questions arise, please call our office at [phone], [place] - NP

## 2023-02-03 NOTE — ANESTHESIA PRE PROCEDURE
Department of Anesthesiology  Preprocedure Note       Name:  Eva Ashton   Age:  25 y.o.  :  2000                                          MRN:  [id number]         Date:  2023      Surgeon: Janette Fox):  Kaylin Alexander MD    Procedure: Procedure(s):  APPENDECTOMY LAPAROSCOPIC    Medications prior to admission:   Prior to Admission medications    Medication Sig Start Date End Date Taking? Authorizing Provider   ketoconazole (NIZORAL) 2 % shampoo Apply shampoo foam to affected area and leave it on for 5-10 min and rinse ,twice a week for 2-4 weeks 19   Ar Automatic Reconciliation       Current medications:    Current Facility-Administered Medications   Medication Dose Route Frequency Provider Last Rate Last Admin    morphine sulfate (PF) injection 4 mg  4 mg IntraVENous NOW TIM Armijo        piperacillin-tazobactam (ZOSYN) 4,500 mg in sodium chloride 0.9 % 100 mL IVPB (mini-bag)  4,500 mg IntraVENous NOW TIM Armijo 200 mL/hr at 23 1846 4,500 mg at 23 1846     Current Outpatient Medications   Medication Sig Dispense Refill    ketoconazole (NIZORAL) 2 % shampoo Apply shampoo foam to affected area and leave it on for 5-10 min and rinse ,twice a week for 2-4 weeks         Allergies:  No Known Allergies    Problem List:    Patient Active Problem List   Diagnosis Code    Encounter to discuss test results Z71.2    Elevated ALT measurement R74.01    Rash R21    Crushing injury of finger S67. 10XA    Pityriasis versicolor B36.0    Screening for metabolic disorder A63.388    Injury of nail bed of finger of left hand S69. 92XA    Dietary counseling Z71.3    Closed nondisplaced fracture of distal phalanx of left ring finger S62.665A    Exercise counseling Z71.82    Hyperlipidemia E78.5       Past Medical History:  No past medical history on file. Past Surgical History:  No past surgical history on file.     Social History:    Social History     Tobacco Use    Smoking status: Never    Smokeless tobacco: Never   Substance Use Topics    Alcohol use: Never                                Counseling given: Not Answered      Vital Signs (Current):   Vitals:    02/02/23 1607 02/02/23 1612   BP: (!) 143/84    Pulse: 99    Resp:  18   Temp: 98.2 °F (36.8 °C)    SpO2: 99%    Weight:  198 lb 3.2 oz (89.9 kg)   Height:  5' 8.11\" (1.73 m)                                              BP Readings from Last 3 Encounters:   02/02/23 (!) 143/84   07/25/19 130/70       NPO Status:                                                                                 BMI:   Wt Readings from Last 3 Encounters:   02/02/23 198 lb 3.2 oz (89.9 kg)   07/25/19 141 lb (64 kg) (32 %, Z= -0.47)*     * Growth percentiles are based on CDC (Boys, 2-20 Years) data. Body mass index is 30.04 kg/m². CBC:   Lab Results   Component Value Date/Time    WBC 14.4 02/02/2023 04:23 PM    RBC 5.51 02/02/2023 04:23 PM    HGB 15.4 02/02/2023 04:23 PM    HCT 45.9 02/02/2023 04:23 PM    MCV 83.3 02/02/2023 04:23 PM    RDW 13.7 02/02/2023 04:23 PM     02/02/2023 04:23 PM       CMP:   Lab Results   Component Value Date/Time     02/02/2023 04:23 PM    K 4.0 02/02/2023 04:23 PM     02/02/2023 04:23 PM    CO2 26 02/02/2023 04:23 PM    BUN 15 02/02/2023 04:23 PM    CREATININE 1.00 02/02/2023 04:23 PM    GFRAA 133 07/25/2019 08:34 AM    AGRATIO 2.3 07/25/2019 08:34 AM    LABGLOM >60 02/02/2023 04:23 PM    GLUCOSE 113 02/02/2023 04:23 PM    PROT 7.4 02/02/2023 04:23 PM    CALCIUM 9.4 02/02/2023 04:23 PM    BILITOT 0.4 02/02/2023 04:23 PM    ALKPHOS 94 02/02/2023 04:23 PM    ALKPHOS 88 07/25/2019 08:34 AM    AST 52 02/02/2023 04:23 PM     02/02/2023 04:23 PM       POC Tests: No results for input(s): POCGLU, POCNA, POCK, POCCL, POCBUN, POCHEMO, POCHCT in the last 72 hours.     Coags: No results found for: PROTIME, INR, APTT    HCG (If Applicable): No results found for: PREGTESTUR, PREGSERUM, HCG, HCGQUANT ABGs: No results found for: PHART, PO2ART, HJT1SFX, BSC9TES, BEART, G2EWNHGO     Type & Screen (If Applicable):  No results found for: LABABO, LABRH    Drug/Infectious Status (If Applicable):  No results found for: HIV, HEPCAB    COVID-19 Screening (If Applicable): No results found for: COVID19        Anesthesia Evaluation  Patient summary reviewed  Airway: Mallampati: II  TM distance: >3 FB   Neck ROM: full  Mouth opening: > = 3 FB   Dental: normal exam         Pulmonary:normal exam                               Cardiovascular:    (+) hyperlipidemia                  Neuro/Psych:               GI/Hepatic/Renal:             Endo/Other:                     Abdominal:             Vascular: Other Findings:           Anesthesia Plan      general     ASA 1             Anesthetic plan and risks discussed with patient.                         Addis Hooks MD   2/2/2023

## 2023-02-03 NOTE — PROGRESS NOTES
TRANSFER - IN REPORT:    Verbal report received from April RN on Eva Medico  being received from PACU for routine progression of patient care      Report consisted of patient's Situation, Background, Assessment and   Recommendations(SBAR). Information from the following report(s) Adult Overview, Surgery Report, and MAR was reviewed with the receiving nurse. Opportunity for questions and clarification was provided. Assessment completed upon patient's arrival to unit and care assumed.

## 2023-02-03 NOTE — PLAN OF CARE
Problem: Pain  Goal: Verbalizes/displays adequate comfort level or baseline comfort level  2/3/2023 0929 by Alma Donis RN  Outcome: Progressing  2/3/2023 0127 by Ann-Marie Meraz RN  Outcome: Not Progressing  2/3/2023 0127 by Ann-Marie Meraz RN  Outcome: Not Progressing     Problem: Discharge Planning  Goal: Discharge to home or other facility with appropriate resources  Outcome: Progressing     Problem: ABCDS Injury Assessment  Goal: Absence of physical injury  Outcome: Progressing     Problem: Skin/Tissue Integrity  Goal: Absence of new skin breakdown  Description: 1. Monitor for areas of redness and/or skin breakdown  2. Assess vascular access sites hourly  3. Every 4-6 hours minimum:  Change oxygen saturation probe site  4. Every 4-6 hours:  If on nasal continuous positive airway pressure, respiratory therapy assess nares and determine need for appliance change or resting period.   2/3/2023 0929 by Alma Donis RN  Outcome: Progressing  2/3/2023 0133 by Ann-Marie Meraz RN  Outcome: Progressing     Problem: Pain  Goal: Verbalizes/displays adequate comfort level or baseline comfort level  2/3/2023 0929 by Alma Donis RN  Outcome: Progressing  2/3/2023 0127 by Ann-Marie Meraz RN  Outcome: Not Progressing  2/3/2023 0127 by Ann-Marie Meraz RN  Outcome: Not Progressing

## 2023-02-03 NOTE — DISCHARGE INSTRUCTIONS
Discharge Instructions/Follow-up Plans:   MD Instructions: Follow up with your primary care doctor concerning your elevated liver function tests. Follow-up with Boone Nayak NP in 2 weeks     Incisions  Keep incisions clean and dry, may remain uncovered. Do not apply lotions, creams or ointments to incisions. Showers are allowed - gently wash and pat dry your incisions. No tub baths or soaking/submerging until cleared by follow up provider. Diet -   Diet as tolerated - Soft foods diet for 2 days after surgery. If you have tolerated soft foods/easy to chew foods for the 1st two days after surgery, you may advance your diet to your regular diet as tolerated. Activity   No heavy lifting >10 lb for the first week after surgery. Lift nothing heavier than 25 lbs for 3 weeks after surgery. Walking and non-strenuous exercise promotes good oxygenated blood supply to body tissues and will assist in recovery. Walking and non-strenuous exercise also promotes good lung mechanics and reduces chance of developing pneumonia. Medications  No driving while taking narcotics/(prescription strength pain medication). Do not drink alcohol while taking narcotics. Resume other home medications. Narcotic pain medication is known to cause constipation as narcotic pain medication slows gut motility. Even if you are not taking oral narcotic pain medication, you have likely been given narcotic pain medication intravenously during your surgical procedure. Do not get constipated! No more than 2 days without a bm. If 2 days no bm, then take colace stool softener twice a day. If 24 hours of colace does not produce a bm , then keep taking colace and add miralax laxative twice a day until you have a bm. Once you are having regular bms, you can use colace and miralax as needed.        If problems (fever, signs of infection, uncontrolled bleeding or drainage from surgical incision, uncontrolled pain, uncontrolled nausea and/or vomiting) or questions arise, please call our office at [phone] [address] Office       Appendectomy: What to Expect at [address] [facility] doctor removed your appendix either by making many small cuts, called incisions, in your belly (laparoscopic surgery) or through open surgery. In open surgery, the doctor makes one large incision. The incisions leave scars that usually fade over time. After your surgery, it is normal to feel weak and tired for several days after you return home. Your belly may be swollen and may be painful. If you had laparoscopic surgery, you may have pain in your shoulder for about 24 hours. You may also feel sick to your stomach and have diarrhea, constipation, gas, or a headache. This usually goes away in a few days. Your recovery time depends on the type of surgery you had. If you had laparoscopic surgery, you will probably be able to return to work or a normal routine 1 to 3 weeks after surgery. If you had an open surgery, it may take 2 to 4 weeks. If your appendix ruptured, you may have a drain in your incision. Your body will work fine without an appendix. You will not have to make any changes in your diet or lifestyle. This care sheet gives you a general idea about how long it will take for you to recover. But each person recovers at a different pace. Follow the steps below to get better as quickly as possible. How can you care for yourself at home? Activity  Rest when you feel tired. Getting enough sleep will help you recover. Try to walk each day. Start by walking a little more than you did the day before. Bit by bit, increase the amount you walk. Walking boosts blood flow and helps prevent pneumonia and constipation. For about 2 weeks, avoid lifting anything that would make you strain.  This may include a child, heavy grocery bags and milk containers, a heavy briefcase or backpack, cat litter or dog food bags, or a vacuum . Avoid strenuous activities, such as bicycle riding, jogging, weight lifting, or aerobic exercise, until your doctor says it is okay. You may be able to take showers (unless you have a drain near your incision) 24 to 48 hours after surgery. Pat the incision dry. Do not take a bath for the first 2 weeks, or until your doctor tells you it is okay. If you have a drain near your incision, follow your doctor's instructions. You may drive when you are no longer taking pain medicine and can quickly move your foot from the gas pedal to the brake. You must also be able to sit comfortably for a long period of time, even if you do not plan on going far. You might get caught in traffic. You will probably be able to go back to work in 1 to 3 weeks. If you had an open surgery, it may take 3 to 4 weeks. Your doctor will tell you when you can have sex again. Diet  You can eat your normal diet. If your stomach is upset, try bland, low-fat foods like plain rice, broiled chicken, toast, and yogurt. Drink plenty of fluids (unless your doctor tells you not to). You may notice that your bowel movements are not regular right after your surgery. This is common. Try to avoid constipation and straining with bowel movements. You may want to take a fiber supplement every day. If you have not had a bowel movement after a couple of days, ask your doctor about taking a mild laxative. Medicines  Your doctor will tell you if and when you can restart your medicines. He or she will also give you instructions about taking any new medicines. If you take blood thinners, such as warfarin (Coumadin), clopidogrel (Plavix), or aspirin, be sure to talk to your doctor. He or she will tell you if and when to start taking those medicines again. Make sure that you understand exactly what your doctor wants you to do. If your appendix ruptured, you will need to take antibiotics. Take them as directed.  Do not stop taking them just because you feel better. You need to take the full course of antibiotics. Be safe with medicines. Take pain medicines exactly as directed. If the doctor gave you a prescription medicine for pain, take it as prescribed. If you are not taking a prescription pain medicine, take an over-the-counter medicine such as acetaminophen (Tylenol), ibuprofen (Advil, Motrin), or naproxen (Aleve). Read and follow all instructions on the label. Do not take two or more pain medicines at the same time unless the doctor told you to. Many pain medicines have acetaminophen, which is Tylenol. Too much Tylenol can be harmful. If you think your pain medicine is making you sick to your stomach: Take your medicine after meals (unless your doctor has told you not to). Ask your doctor for a different pain medicine. Incision care  If you had an open surgery, you may have staples in your incision. The doctor will take these out in 7 to 10 days. If you have strips of tape on the incision, leave the tape on for a week or until it falls off. You may wash the area with warm, soapy water 24 to 48 hours after your surgery, unless your doctor tells you not to. Pat the area dry. Keep the area clean and dry. You may cover it with a gauze bandage if it weeps or rubs against clothing. Change the bandage every day. Follow-up care is a key part of your treatment and safety. Be sure to make and go to all appointments, and call your doctor if you are having problems. It's also a good idea to know your test results and keep a list of the medicines you take. When should you call for help? Call 911 anytime you think you may need emergency care. For example, call if:  You passed out (lost consciousness). You have sudden chest pain and shortness of breath, or you cough up blood. You have severe trouble breathing. Call your doctor now or seek immediate medical care if:  You are sick to your stomach and cannot drink fluids. You have severe diarrhea.   You have pain that does not get better when you take your pain medicine. You have signs of infection, such as: Increased pain, swelling, warmth, or redness. Red streaks leading from the wound. Pus draining from the wound. A fever. You have loose stitches, or your incision comes open. Bright red blood has soaked through a large bandage. You have signs of a blood clot, such as:  Pain in your calf, back of knee, thigh, or groin. Redness and swelling in your leg or groin. Watch closely for any changes in your health, and be sure to contact your doctor if:  You had a laparoscopic surgery and your shoulder pain lasts more than 24 hours. The amount of drainage suddenly increases, or the color and texture changes. You do not have a bowel movement after taking a laxative. After general anesthesia or intravenous sedation, for 24 hours or while taking prescription Narcotics:  Limit your activities  A responsible adult needs to be with you for the next 24 hours  Do not drive and operate hazardous machinery  Do not make important personal or business decisions  Do not drink alcoholic beverages  If you have not urinated within 8 hours after discharge, and you are experiencing discomfort from urinary retention, please go to the nearest ED. If you have sleep apnea and have a CPAP machine, please use it for all naps and sleeping. Please use caution when taking narcotics and any of your home medications that may cause drowsiness. *  Please give a list of your current medications to your Primary Care Provider. *  Please update this list whenever your medications are discontinued, doses are      changed, or new medications (including over-the-counter products) are added. *  Please carry medication information at all times in case of emergency situations.     These are general instructions for a healthy lifestyle:  No smoking/ No tobacco products/ Avoid exposure to second hand smoke  Surgeon General's Warning:  Quitting smoking now greatly reduces serious risk to your health. Obesity, smoking, and sedentary lifestyle greatly increases your risk for illness  A healthy diet, regular physical exercise & weight monitoring are important for maintaining a healthy lifestyle    You may be retaining fluid if you have a history of heart failure or if you experience any of the following symptoms:  Weight gain of 3 pounds or more overnight or 5 pounds in a week, increased swelling in our hands or feet or shortness of breath while lying flat in bed. Please call your doctor as soon as you notice any of these symptoms; do not wait until your next office visit. IF YOU HAVE ANY FEVER >100.5, EXCESSIVE PAIN OR NAUSEA, REDNESS/ SWELLING/ ODOR/ DRAINAGE PLEASE CALL DR. LOPEZ'S OFFICE OR COME TO THE EMERGENCY ROOM. Appendectomy: What to Expect at Home  Your Recovery     Your doctor removed your appendix either by making many small cuts, called incisions, in your belly (laparoscopic surgery) or through open surgery. In open surgery, the doctor makes one large incision. The incisions leave scars that usually fade over time. After your surgery, it is normal to feel weak and tired for several days after you return home. Your belly may be swollen and may be painful. If you had laparoscopic surgery, you may have pain in your shoulder for about 24 hours. You may also feel sick to your stomach and have diarrhea, constipation, gas, or a headache. This usually goes away in a few days. Your recovery time depends on the type of surgery you had. If you had laparoscopic surgery, you will probably be able to return to work or a normal routine 1 to 3 weeks after surgery. If you had an open surgery, it may take 2 to 4 weeks. If your appendix ruptured, you may have a drain in your incision. Your body will work fine without an appendix. You won't have to make any changes in your diet or lifestyle.   This care sheet gives you a general idea about how long it will take for you to recover. But each person recovers at a different pace. Follow the steps below to get better as quickly as possible. How can you care for yourself at home? Activity    Rest when you feel tired. Getting enough sleep will help you recover. Try to walk each day. Start by walking a little more than you did the day before. Bit by bit, increase the amount you walk. Walking boosts blood flow and helps prevent pneumonia and constipation. For about 2 weeks, avoid lifting anything that would make you strain. This may include a child, heavy grocery bags and milk containers, a heavy briefcase or backpack, cat litter or dog food bags, or a vacuum . Avoid strenuous activities, such as bicycle riding, jogging, weight lifting, or aerobic exercise, until your doctor says it is okay. You may be able to take showers (unless you have a drain near your incision) 24 to 48 hours after surgery. Pat the incision dry. Do not take a bath for the first 2 weeks, or until your doctor tells you it is okay. If you have a drain near your incision, follow your doctor's instructions. You may drive when you are no longer taking pain medicine and can quickly move your foot from the gas pedal to the brake. You must also be able to sit comfortably for a long period of time, even if you do not plan on going far. You might get caught in traffic. You will probably be able to go back to work in 1 to 3 weeks. If you had an open surgery, it may take 3 to 4 weeks. Your doctor will tell you when you can have sex again. Diet    You can eat your normal diet. If your stomach is upset, try bland, low-fat foods like plain rice, broiled chicken, toast, and yogurt. Drink plenty of fluids (unless your doctor tells you not to). You may notice that your bowel movements are not regular right after your surgery. This is common. Try to avoid constipation and straining with bowel movements.  You may want to take a fiber supplement every day. If you have not had a bowel movement after a couple of days, ask your doctor about taking a mild laxative. Medicines    Your doctor will tell you if and when you can restart your medicines. He or she will also give you instructions about taking any new medicines. If you stopped taking aspirin or some other blood thinner, your doctor will tell you when to start taking it again. If your appendix ruptured, you will need to take antibiotics. Take them as directed. Do not stop taking them just because you feel better. You need to take the full course of antibiotics. Be safe with medicines. Take pain medicines exactly as directed. If the doctor gave you a prescription medicine for pain, take it as prescribed. If you are not taking a prescription pain medicine, take an over-the-counter medicine such as acetaminophen (Tylenol), ibuprofen (Advil, Motrin), or naproxen (Aleve). Read and follow all instructions on the label. Do not take two or more pain medicines at the same time unless the doctor told you to. Many pain medicines have acetaminophen, which is Tylenol. Too much Tylenol can be harmful. If you think your pain medicine is making you sick to your stomach: Take your medicine after meals (unless your doctor has told you not to). Ask your doctor for a different pain medicine. Incision care    If you had an open surgery, you may have staples in your incision. The doctor will take these out in 7 to 10 days. If you have strips of tape on the incision, leave the tape on for a week or until it falls off. You may wash the area with warm, soapy water 24 to 48 hours after your surgery, unless your doctor tells you not to. Pat the area dry. Keep the area clean and dry. You may cover it with a gauze bandage if it weeps or rubs against clothing. Change the bandage every day. If your appendix ruptured, you may have an incision with packing in it. Change the packing as often as your doctor tells you to. Packing changes may hurt at first. Taking pain medicine about half an hour before you change the dressing can help. If your dressing sticks to your wound, try soaking it with warm water for about 10 minutes before you remove it. You can do this in the shower or by placing a wet washcloth over the dressing. Remove the old packing and flush the incision with water. Gently pat the top area dry. The size of the incision determines how much gauze you need to put inside. Fold the gauze over once, but do not wad it up so that it hurts. Put it in the wound carefully. You want to keep the sides of the wound from touching. A cotton swab may help you push the gauze in as needed. Put a gauze pad over the wound, and tape it down. You may notice greenish gray fluid seeping from your wound as you start to heal. This is normal. It is a sign that your wound is healing. Follow-up care is a key part of your treatment and safety. Be sure to make and go to all appointments, and call your doctor if you are having problems. It's also a good idea to know your test results and keep a list of the medicines you take. When should you call for help? Call 911 anytime you think you may need emergency care. For example, call if:    You passed out (lost consciousness). You are short of breath. Call your doctor now or seek immediate medical care if:    You are sick to your stomach and cannot drink fluids. You cannot pass stools or gas. You have pain that does not get better when you take your pain medicine. You have signs of infection, such as: Increased pain, swelling, warmth, or redness. Red streaks leading from the wound. Pus draining from the wound. A fever. You have loose stitches, or your incision comes open. Bright red blood has soaked through the bandage over your incision.      You have signs of a blood clot in your leg (called a deep vein thrombosis), such as:  Pain in your calf, back of knee, thigh, or groin. Redness and swelling in your leg or groin. Watch closely for any changes in your health, and be sure to contact your doctor if you have any problems. Where can you learn more? Go to http://www.woods.com/ and enter X801 to learn more about \"Appendectomy: What to Expect at Home. \"  Current as of: January 20, 2022               Content Version: 13.5  © 2006-2022 [employer]. Care instructions adapted under license by [facility] ([facility]). If you have questions about a medical condition or this instruction, always ask your healthcare professional. [address] any warranty or liability for your use of this information.

## 2023-02-03 NOTE — PROGRESS NOTES
[facility]  [address]  Phone: [phone]             February 3, 2023    Patient: Erick Franks   YOB: 2000   Date of Visit: 2/2/2023       To Whom It May Concern:    Erick Franks was seen and treated in our facility  beginning 2/2/2023 until 2/3/23. He may return to work on 2/24/23. He may not lift anything heavier than 25 lbs for 3 weeks after surgery. Surgery date 2/2/23.  Please call the [address] [place] [address] with any questions [phone]      Sincerely,       MARY Oneil NP         Signature:__________________________________

## 2023-02-03 NOTE — CARE COORDINATION
Patient with discharge orders today. No supportive needs identified to CM. Patient's family to provide transportation home. Patient has met all treatment goals and milestones. CM following until discharged. ASSESSMENT NOTE    Attending Physician: Destiny Stein MD  Admit Problem: Acute appendicitis, unspecified acute appendicitis type [K35.80]  Acute appendicitis [K35.80]  Date/Time of Admission: 2/2/2023  4:22 PM  Problem List:  Patient Active Problem List   Diagnosis    Encounter to discuss test results    Elevated ALT measurement    Rash    Crushing injury of finger    Pityriasis versicolor    Screening for metabolic disorder    Injury of nail bed of finger of left hand    Dietary counseling    Closed nondisplaced fracture of distal phalanx of left ring finger    Exercise counseling    Hyperlipidemia    Acute appendicitis       Service Assessment  Patient Orientation Alert and Oriented   Cognition Alert   History Provided By Patient   Primary Caregiver Self   Accompanied By/Relationship     Support Systems Spouse/Significant Other   Patient's Healthcare Decision Maker is: Patient Declined (Legal Next of [address] [place] [address] as Decision Maker)   PCP Verified by CM Yes (Dr Siddhartha Cristina [phone])   Last Visit to PCP     Prior Functional Level Independent in ADLs/IADLs   Current Functional Level Independent in ADLs/IADLs   Can patient return to prior living arrangement Yes   Ability to make needs known: Good   Family able to assist with home care needs: Yes   Would you like for me to discuss the discharge plan with any other family members/significant others, and if so, who?  Yes (family)   Financial Resources     Community Resources     CM/SW Referral       Social/Functional History  Lives With Significant other   Type of [facility] [address] Access     Entrance Stairs - Number of Steps     Entrance Stairs - Rails     Bathroom Shower/Tub     Bathroom Toilet     Bathroom Equipment [address] Help From Family, Friend(s)   ADL Assistance Independent   Bath     Dressing     Grooming     Feeding     Toileting     [address]     Meal Prep     Laundry     Vacuuming     Cleaning     Gardening     Yard Work     Driving     Shopping          Other (Comment)     Homemaking Responsibilities     Meal Prep Responsibility     [address] Paying/Finance Responsibility     [address] Management     Other (Comment)     Ambulation Assistance     Transfer Assistance     Active  Yes   Patient's  Info     Mode of Transportation Car   Education     Occupation Full time employment   Type of Occupation       Discharge Planning   Type of Residence [facility] Prior To Admission None   Potential Assistance Needed N/A   DME     DME     DME Ordered? No   Potential Assistance Purchasing Medications No   Meds-to-Beds: Does the patient want to have any new prescriptions delivered to bedside prior to discharge? Type of Home Care Services None   Patient expects to be discharged to: Apartment   Follow Up Appointment: Best Day/Time     One/Two Story Residence:     # of Interior Steps     Height of Each Step (in)     [employer] Available     History of Falls? Services At/After Discharge  Transition of Care Consult (CM Consult): Discharge Planning   Internal Home Health     Internal Hospice     Reason Outside Agency [address]     Partner SNF     Reason Why Partner SNF Not Chosen     Internal Comfort Care     Reason Outside [address] Discharge None   [place] Resource Information Provided?  No   Mode of [address] at Discharge [address] Time of Discharge     Confirm Follow Up Transport Self Condition of Participation: Discharge Planning  The plan for Transition of Care is related to the following treatment goals: return to baseline   The Patient and/or Patient Representative was provided with a Choice of Provider? Patient   Name of the Patient Representative who was provided with the Choice of Provider and agrees with the Discharge Plan? The Patient and/or Patient Representative Agree with the Discharge Plan? Yes   Freedom of Choice list was provided with basic dialogue that supports the individualized plan of care/goals, treatment preferences, and shares the quality data associated with the providers?  Yes     Documentation for Discharge Appeal  Discharge Appealed by     Date notified by [facility] of appeal request:     Time notified by QIO of appeal request:     Detailed Notice of Discharge given to:     Date Notice of Discharge given:     Time Notice of Discharge given:     [facility]     Time [facility]     Date Notified of Outcome     Time Notified of Outcome     Outcome of appeal           Ariana Daniels RN 02/03/23 11:54 AM

## 2023-02-03 NOTE — PLAN OF CARE
Problem: Skin/Tissue Integrity  Goal: Absence of new skin breakdown  Description: 1. Monitor for areas of redness and/or skin breakdown  2. Assess vascular access sites hourly  3. Every 4-6 hours minimum:  Change oxygen saturation probe site  4. Every 4-6 hours:  If on nasal continuous positive airway pressure, respiratory therapy assess nares and determine need for appliance change or resting period.   Outcome: Progressing     Problem: Pain  Goal: Verbalizes/displays adequate comfort level or baseline comfort level  2/3/2023 0127 by Agustin Manuel RN  Outcome: Not Progressing  2/3/2023 0127 by Agustin Manuel RN  Outcome: Not Progressing     Problem: Pain  Goal: Verbalizes/displays adequate comfort level or baseline comfort level  2/3/2023 0127 by Agustin Manuel RN  Outcome: Not Progressing  2/3/2023 0127 by Agustin Manuel, RN  Outcome: Not Progressing

## 2023-02-03 NOTE — ANESTHESIA POSTPROCEDURE EVALUATION
Department of Anesthesiology  Postprocedure Note    Patient: Marlyn Graf  MRN: [id number]  YOB: 2000  Date of evaluation: 2/2/2023      Procedure Summary     Date: 02/02/23 Room / Location: [facility] MAIN OR  / [facility] MAIN OR    Anesthesia Start: 2038 Anesthesia Stop: 2148    Procedure: APPENDECTOMY LAPAROSCOPIC (Abdomen) Diagnosis:       Appendicitis, unspecified appendicitis type      (Appendicitis, unspecified appendicitis type [K37])    Providers: Abraham Dela Cruz MD Responsible Provider: Len Felipe MD    Anesthesia Type: General ASA Status: 1          Anesthesia Type: General    Crista Phase I: Crista Score: 9    Crista Phase II:        Anesthesia Post Evaluation    Patient location during evaluation: PACU  Patient participation: complete - patient participated  Level of consciousness: awake  Airway patency: patent  Nausea & Vomiting: no nausea  Complications: no  Cardiovascular status: blood pressure returned to baseline and hemodynamically stable  Respiratory status: acceptable  Hydration status: stable  Multimodal analgesia pain management approach

## 2023-02-03 NOTE — PERIOP NOTE
TRANSFER - OUT REPORT:    Verbal report given to LIZETTE Merida on [facility]  being transferred to [facility] for routine progression of patient care       Report consisted of patients Situation, Background, Assessment and   Recommendations(SBAR). Information from the following report(s) Nurse Handoff Report, Adult Overview, Surgery Report, and Cardiac Rhythm sr  was reviewed with the receiving nurse. Lines:   Peripheral IV 02/02/23 Left Antecubital (Active)   Site Assessment Clean, dry & intact 02/02/23 1654   Line Status Blood return noted 02/02/23 1654   Phlebitis Assessment No symptoms 02/02/23 1654   Infiltration Assessment 0 02/02/23 1654   Dressing Status New dressing applied;Clean, dry & intact 02/02/23 1654   Dressing Type Transparent 02/02/23 1654   Dressing Intervention New 02/02/23 1654        Opportunity for questions and clarification was provided. Patient transported with:   Tech    VTE prophylaxis orders have been written for [facility]. Patient and family given floor number and nurses name. Family updated re: pt status after security code verified.

## 2023-02-03 NOTE — BRIEF OP NOTE
Brief Postoperative Note      Patient: Jacquelyn Maldonado  YOB: 2000  MRN: [id number]    Date of Procedure: 2/2/2023    Pre-Op Diagnosis: Appendicitis, unspecified appendicitis type [K37]    Post-Op Diagnosis: Same       Procedure(s):  APPENDECTOMY LAPAROSCOPIC    Surgeon(s):  Pennie Justin MD    Assistant:  * No surgical staff found *    Anesthesia: General    Estimated Blood Loss (mL): Minimal    Complications: None    Specimens:   ID Type Source Tests Collected by Time Destination   A : appendix Tissue Appendix SURGICAL PATHOLOGY Pennie Justin MD 2/2/2023 2117        Implants:  * No implants in log *      Drains:   Urinary Catheter 02/02/23 2 Way (Active)       Findings: appendicitis    Electronically signed by Pennie Serrato MD on 2/2/2023 at 9:35 PM

## 2023-02-03 NOTE — OP NOTE
Operative Note      Patient: Brayden Cook  YOB: 2000  MRN: [id number]    Date of Procedure: 2/2/2023    Pre-Op Diagnosis: Appendicitis, unspecified appendicitis type [K37]    Post-Op Diagnosis: Same       Procedure(s):  APPENDECTOMY LAPAROSCOPIC    Surgeon(s):  Chavez Washington MD    Assistant:   * No surgical staff found *    Anesthesia: General    Estimated Blood Loss (mL): Minimal    Complications: None    Specimens:   ID Type Source Tests Collected by Time Destination   A : appendix Tissue Appendix SURGICAL PATHOLOGY Chavez Washington MD 2/2/2023 2117        Implants:  * No implants in log *      Drains:   Urinary Catheter 02/02/23 2 Way (Active)       Findings: appendicitis    Detailed Description of Procedure:   Patient underwent informed consent with review of the associated risks. Taken to the operating room and positioned appropriately on the operating table. Underwent general anesthesia without complications. Castillo catheter was placed without difficulty. Abdomen was prepped and draped in a sterile fashion. The appropriate timeout was performed. Ancef was given as prophylaxis prior to incision. A site for incision was marked along the infraumbilical skin fold. The area was infiltrated with half percent Marcaine. A skin incision was made along the elizabeth with a scalpel and dissection carried into the deeper tissues using cautery. The umbilical stalk was grasped and elevated and the Veress needle was carefully passed through the midline into the abdomen. Its intra-abdominal location was verified with the saline drop test and pneumoperitoneum was achieved to a pressure of 15. A 12 mm trocar was passed into the abdomen. The scope was introduced. There was no evidence of injury from entry. Under direct visualization a 5 mm trochars placed in the midline lower abdomen. A second was placed under direct visualization in the left lower quadrant.   The bed was positioned in slight head down and left lateral. The appendix was identified coming off of the cecum. It was thick-walled and dilated. It was grasped and elevated. The mesoappendix was divided with the harmonic scalpel back to the cecum. Once the appendix had been completely mobilized the CYNDY stapler with a blue load was used to transect the appendix flush with the cecum. The appendix was then placed in an Endo Catch bag brought through the 12 mm trocar site. Hemostasis was verified in the right lower quadrant. The right lower quadrant was thoroughly irrigated and all fluid suctioned from the abdomen. She was then flattened on the operating table. The abdomen was desufflated and all trochars were removed. The appendix in the Endo Catch bag was then removed from the 12 mm trocar site. The fascia at the umbilicus was closed with a figure-of-eight 0 Vicryl suture. All incisions were infiltrated with the remainder of the local anesthetic for a total of 30 cc of half percent Marcaine for the case. Each incision was then closed with a 3-0 Vicryl suture in the dermis followed by 4 Monocryl subcuticular stitch with Dermabond. She tolerated the procedure well without complications. Lap and instrument count at the completion of the procedure was correct x2. Estimated blood loss was 5 mL or less. Patient condition at the completion of the procedure was stable and she was awoken from anesthesia then transported to recovery in stable condition.      Electronically signed by Kiran Laureano MD on 2/2/2023 at 9:35 PM

## 2023-02-03 NOTE — PROGRESS NOTES
END OF SHIFT NOTE:    INTAKE/OUTPUT  02/02 0701 - 02/03 0700  In: 2100   Out: 450 [Urine:450]  Voiding: Yes  Catheter: No  Drain:              Flatus: Patient does not have flatus present. Stool:  occurrences. Characteristics:           Stool Assessment  Last BM (including prior to admit): 02/01/23    Emesis:  occurrences. Characteristics:        VITAL SIGNS  Patient Vitals for the past 12 hrs:   Temp Pulse Resp BP SpO2   02/03/23 0225 99.5 °F (37.5 °C) (!) 122 17 121/66 94 %   02/02/23 2244 98.9 °F (37.2 °C) 99 16 (!) 140/84 94 %   02/02/23 2232 98.8 °F (37.1 °C) 89 16 130/66 94 %   02/02/23 2227 -- 92 16 131/66 94 %   02/02/23 2222 -- 99 16 132/68 96 %   02/02/23 2217 -- 81 16 (!) 143/73 100 %   02/02/23 2212 -- 96 16 (!) 145/79 100 %   02/02/23 2207 -- 97 16 134/69 99 %   02/02/23 2202 -- 90 16 131/70 99 %   02/02/23 2157 -- 91 16 (!) 142/77 97 %   02/02/23 2152 -- 100 16 136/74 97 %   02/02/23 2147 98.7 °F (37.1 °C) 100 16 124/68 98 %   02/2000 -- 96 17 (!) 135/99 99 %   02/02/23 1847 -- -- -- 138/70 --       Pain Assessment  Pain Level: 1 (02/03/23 0151)  Pain Location: Abdomen  Patient's Stated Pain Goal: 0 - No pain    Ambulating  No- none since arrival to floor    Shift report given to oncoming nurse at the bedside.     Davide Chapin RN

## 2023-02-13 ENCOUNTER — TELEPHONE (OUTPATIENT)
Dept: SURGERY | Age: 23
End: 2023-02-13

## 2023-02-13 NOTE — TELEPHONE ENCOUNTER
Unable to reach patient on his line, lvm on his wife line to call the office to r/s his appt on 2/15/2023 to Dr. Roxanna Casiano instead of Rosa Del Cid NP.

## 2023-02-15 ENCOUNTER — OFFICE VISIT (OUTPATIENT)
Dept: SURGERY | Age: 23
End: 2023-02-15

## 2023-02-15 DIAGNOSIS — D3A.020 CARCINOID TUMOR OF APPENDIX, UNSPECIFIED WHETHER MALIGNANT: ICD-10-CM

## 2023-02-15 DIAGNOSIS — K35.30 ACUTE APPENDICITIS WITH LOCALIZED PERITONITIS, WITHOUT PERFORATION, ABSCESS, OR GANGRENE: Primary | ICD-10-CM

## 2023-02-15 PROCEDURE — 99024 POSTOP FOLLOW-UP VISIT: CPT | Performed by: SURGERY

## 2023-02-15 ASSESSMENT — ENCOUNTER SYMPTOMS
RESPIRATORY NEGATIVE: 1
GASTROINTESTINAL NEGATIVE: 1

## 2023-02-15 NOTE — PROGRESS NOTES
2/15/2023    Moises Reyes  MRN: [id number]      CHIEF COMPLAINT: Feeling fine      PRIMARY CARE PHYSICIAN: Linn Kc MD      HISTORY:  Underwent laparoscopic appendectomy for acute appendicitis. He has been healing well since surgery and feels fine. He has no specific postsurgical complaints. 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 The appendix shows a 0.5 cm focus of carcinoid tumor in the mid   portion. Transmural acute inflammation is also seen. The margin is free   of tumor. Dissection reveals grossly unremarkable appendiceal mucosa and no fecaliths. REVIEW OF SYSTEMS:  Review of Systems   Constitutional: Negative. Respiratory: Negative. Cardiovascular: Negative. Gastrointestinal: Negative. Genitourinary: Negative. History reviewed. No pertinent past medical history. Current Outpatient Medications   Medication Sig Dispense Refill    ketoconazole (NIZORAL) 2 % shampoo Apply shampoo foam to affected area and leave it on for 5-10 min and rinse ,twice a week for 2-4 weeks (Patient not taking: No sig reported)       No current facility-administered medications for this visit. Family History   Problem Relation Age of Onset    No Known Problems Mother     No Known Problems Father        Social History     Socioeconomic History    Marital status: Single     Spouse name: None    Number of children: None    Years of education: None    Highest education level: None   Tobacco Use    Smoking status: Never    Smokeless tobacco: Never   Substance and Sexual Activity    Alcohol use: Never    Drug use: Never         PHYSICAL EXAMINATION:  Physical Exam  Cardiovascular:      Rate and Rhythm: Normal rate and regular rhythm. Pulmonary:      Effort: Pulmonary effort is normal.      Breath sounds: Normal breath sounds. Abdominal:      General: Abdomen is flat.       Palpations: Abdomen is soft.      Comments: Incisions healing well without signs of infection   Skin:     General: Skin is warm and dry. Neurological:      General: No focal deficit present. Mental Status: He is oriented to person, place, and time. There were no vitals taken for this visit. IMPRESSION:  Status post laparoscopic appendectomy for acute appendicitis. Incidental finding of a 5 mm appendiceal carcinoid with good margins. I reviewed the pathology with him. At that size with negative margins I recommended no additional treatments. I suggested that he follow-up with his primary care physician to review the findings and monitor for any problems. He can call me with any issues. He can resume normal activities at 4 weeks postoperatively. ASSESSMENT/PLAN:  Jenaro Carbajal was seen today for post-op check. Diagnoses and all orders for this visit:    Acute appendicitis with localized peritonitis, without perforation, abscess, or gangrene    Carcinoid tumor of appendix, unspecified whether malignant    Follow-up with PCP.     Melissa Aldana MD

## 2023-04-07 ENCOUNTER — TELEPHONE (OUTPATIENT)
Dept: SURGERY | Age: 23
End: 2023-04-07

## (undated) DEVICE — TROCAR: Brand: KII FIOS FIRST ENTRY

## (undated) DEVICE — BLADE CLIPPER GEN PURP NS

## (undated) DEVICE — SUTURE VCRL SZ 4-0 L27IN ABSRB UD L19MM PS-2 3/8 CIR PRIM J426H

## (undated) DEVICE — SUREFIT, DUAL DISPERSIVE ELECTRODE, CONTACT QUALITY MONITOR: Brand: SUREFIT

## (undated) DEVICE — NEEDLE INSUF L120MM ULT VERES ENDOPATH

## (undated) DEVICE — SEALANT TISS 4 CC FIBRIN VISTASEAL

## (undated) DEVICE — TUBING INSUFFLATION SMK EVAC HI FLO SET PNEUMOCLEAR

## (undated) DEVICE — TOTAL TRAY, DB, 100% SILI FOLEY, 16FR 10: Brand: MEDLINE

## (undated) DEVICE — SOLUTION IRRIG 1000ML 09% SOD CHL USP PIC PLAS CONTAINER

## (undated) DEVICE — RELOAD STPL SZ 0 L45MM DIA3.5MM 0DEG STD REG TISS BLU TI

## (undated) DEVICE — ADHESIVE SKIN CLSR 0.7ML TOP DERMBND ADV

## (undated) DEVICE — SUTURE VCRL SZ 3-0 L27IN ABSRB UD L26MM SH 1/2 CIR J416H

## (undated) DEVICE — APPLICATOR LAP 45CM FLX 2 VISTASEAL

## (undated) DEVICE — STAPLER INT L340MM 45MM STD 12 FIRING B FRM PWR + GRIPPING

## (undated) DEVICE — SUTURE VCRL 2-0 L27IN ABSRB UD PS-2 L19MM 1/2 CIR J428H

## (undated) DEVICE — TROCAR: Brand: KII® SLEEVE

## (undated) DEVICE — TROCARS: Brand: KII® BLUNT TIP ACCESS SYSTEM

## (undated) DEVICE — TRAY,URINE METER,100% SILICONE,16FR10ML: Brand: MEDLINE

## (undated) DEVICE — PUMP SUC IRR TBNG L10FT W/ HNDPC ASSEMB STRYKEFLOW 2

## (undated) DEVICE — SUTURE SZ 0 27IN 5/8 CIR UR-6  TAPER PT VIOLET ABSRB VICRYL J603H

## (undated) DEVICE — CUTTER ENDOSCP L340MM LIN ARTC SGL STROKE FIRING ENDOPATH

## (undated) DEVICE — TAPE MED NTG REM ADH NONSTERILE

## (undated) DEVICE — SOLUTION IRRIGATION SODIUM CHL 0.9% TITAN XL CONTAINER 4/CA

## (undated) DEVICE — GENERAL LAPAROSCOPY: Brand: MEDLINE INDUSTRIES, INC.

## (undated) DEVICE — GLOVE SURG SZ 75 L12IN FNGR THK79MIL GRN LTX FREE

## (undated) DEVICE — SHEARS ENDOSCP L36CM DIA5MM ULTRASONIC CRV TIP W/ ADV

## (undated) DEVICE — BAG SPEC REM 224ML W4XL6IN DIA10MM 1 HND GYN DISP ENDOPCH

## (undated) DEVICE — BLADE ES ELASTOMERIC COAT INSUL DURABLE BEND UPTO 90DEG